# Patient Record
Sex: FEMALE | Race: AMERICAN INDIAN OR ALASKA NATIVE
[De-identification: names, ages, dates, MRNs, and addresses within clinical notes are randomized per-mention and may not be internally consistent; named-entity substitution may affect disease eponyms.]

---

## 2017-07-13 NOTE — EDM.PDOC
ED HPI GENERAL MEDICAL PROBLEM





- General


Chief Complaint: General


Stated Complaint: AMBULANCE


Time Seen by Provider: 17 02:45


Source of Information: Reports: Patient, RN Notes Reviewed


History Limitations: Reports: No Limitations





- History of Present Illness


INITIAL COMMENTS - FREE TEXT/NARRATIVE: 





ED via [facility] with c/o pain to left ribs after falling carrying in groceries, 

unsure what she fell on, pain worse with movement and taking deep breath.  No 

other reported injuries with fall. 


Onset: Today


Location: Reports: Chest (left)


Quality: Reports: Stabbing (with movment), Throbbing


Improves with: Reports: None


Worsens with: Reports: Movement


  ** Left Chest


Pain Score (Numeric/FACES): 8





- Related Data


 Allergies











Allergy/AdvReac Type Severity Reaction Status Date / Time


 


No Known Allergies Allergy   Verified 17 02:25











Home Meds: 


 Home Meds





Albuterol [Proair HFA] 2 puff INH ASDIRECTED PRN 14 [History]


Fluticasone Propionate [Flovent  MCG] 2 puff INH ASDIRECTED PRN 14 

[History]


Lisinopril 40 mg PO DAILY 14 [History]


sitaGLIPtin Phos/Metformin HCl [Janumet 50-1,000 MG] 1,000 mg PO DAILY 14 

[History]


Insulin Aspart [Novolog Flexpen] 25 units SQ TID PRN 16 [History]


Insulin Glarg,Human.Rec.Analog [Lantus] 30 units SQ BID 16 [History]


Gabapentin [Gabapentin] 600 mg PO TID 16 [History]











Past Medical History


HEENT History: Reports: None


Cardiovascular History: Reports: Hypertension, Other (See Below)


Other Cardiovascular History: hx rhuematic fever


Respiratory History: Reports: Asthma


Gastrointestinal History: Reports: None


Genitourinary History: Reports: None


OB/GYN History: Reports: None


Musculoskeletal History: Reports: Back Pain, Chronic, Osteoarthritis


Neurological History: Reports: Neuropathy, Diabetic, Neuropathy, Peripheral, 

Seizure, TIA


Other Neuro History: mini strokes


Psychiatric History: Reports: None


Endocrine/Metabolic History: Reports: Diabetes, Type II, IDDM, Obesity/BMI 30+


Hematologic History: Reports: None


Immunologic History: Reports: None


Oncologic (Cancer) History: Reports: None


Dermatologic History: Reports: None





- Infectious Disease History


Infectious Disease History: Reports: Chicken Pox





- Past Surgical History


Head Surgeries/Procedures: Reports: None


Female  Surgical History: Reports:  Section





Social & Family History





- Family History


Family Medical History: Noncontributory


Respiratory: Reports: Asthma


Other Respiratory Family Hisory: Brother


Endocrine/Metabolic: Reports: Diabetes, type II


Other Endocrine/Metabolic Family History: Brother





- Tobacco Use


Smoking Status *Q: Current Every Day Smoker


Years of Tobacco use: 20


Packs/Tins Daily: 0.2


Used Tobacco, but Quit: No


Month Tobacco Last Used: 


Second Hand Smoke Exposure: Yes





- Caffeine Use


Caffeine Use: Reports: Soda





- Alcohol Use


Days Per Week of Alcohol Use: 0





- Recreational Drug Use


Recreational Drug Use: No





- Sexual History


Sexual History: Reports: Sexually Active, Single Partner





- Living Situation & Occupation


Living situation: Reports: with Significant Other, with Family


Occupation: Unemployed





ED ROS GENERAL





- Review of Systems


Review Of Systems: ROS reveals no pertinent complaints other than HPI.





ED EXAM, GENERAL





- Physical Exam


Exam: See Below


Exam Limited By: No Limitations


General Appearance: Alert, Mild Distress, Obese


Eye Exam: Bilateral Eye: EOMI, PERRL


Nose: Normal Inspection


Throat/Mouth: No: Normal Teeth (poor dentation)


Head: Atraumatic, Normocephalic


Neck: Normal Inspection


Respiratory/Chest: No Respiratory Distress, Lungs Clear, Decreased Breath 

Sounds (left base), Splinting (left)


Cardiovascular: Normal Peripheral Pulses, Regular Rate, Rhythm


GI/Abdominal: Normal Bowel Sounds, Soft, Non-Tender


Back Exam: Normal Inspection


Extremities: Normal Inspection


Neurological: Alert, Oriented, Normal Cognition, Normal Reflexes


Skin Exam: Warm, Dry, Intact


Lymphatic: No Adenopathy





Course





- Vital Signs


Last Recorded V/S: 


 Last Vital Signs











Temp  97.8 F   17 02:29


 


Pulse  105 H  17 02:29


 


Resp  20   17 02:29


 


BP  149/87 H  17 02:29


 


Pulse Ox  100   17 02:29














- Orders/Labs/Meds


Meds: 


Medications














Discontinued Medications














Generic Name Dose Route Start Last Admin





  Trade Name Freq  PRN Reason Stop Dose Admin


 


Oxycodone/Acetaminophen  1 tab  17 02:56  17 03:03





  Percocet 325-5 Mg  PO  17 02:57  1 tab





  ONETIME ONE   Administration














Departure





- Departure


Time of Disposition: 04:37


Disposition: Home, Self-Care 01


Condition: Good


Clinical Impression: 


 Rib pain on left side








- Discharge Information


Instructions:  Rib Contusion


Forms:  ED Department Discharge


Additional Instructions: 


alternate tylenol 650mg with Ibuprofen 600mg every 4 hours


splint area with coughing


change positions slowly


follow up on Monday in clinic sooner if pain worsens or breathing difficulty

## 2017-08-24 NOTE — EDM.PDOC
ED HPI GENERAL MEDICAL PROBLEM





- General


Chief Complaint: Neuro Symptoms/Deficits


Stated Complaint: GENERAL


Time Seen by Provider: 17 16:50


Source of Information: Reports: Patient


History Limitations: Reports: No Limitations





- History of Present Illness


INITIAL COMMENTS - FREE TEXT/NARRATIVE: 





This 40 yo female patient was brought to the ED by [facility] due to a possible 

stroke. EMS reports when they appeared on scene the patient had slurred speech, 

right sided facial droop, right arm and right leg weakness. EMS reports her 

blood sugar was 120's during their visit. The patient was immediately taken to 

the CT for a scan of her head. The patient's  reports he was with the 

patient this morning until about noon. The patient was acting normally and they 

went for a 2 block walk to their son's school. The  reports he went back 

to work and called the house at about 1300 and the patient was slurring her 

words. The  came directly home and checked the patient's blood sugar. 

The patient's blood sugar at that time was 504. The  gave the patient 

some insulin and returned to work. The  called back to the house at 

about 1500 and the patient was again slurring her words. The  returned 

to the house, checked the patient's blood sugar (340's) and gave her some more 

insulin. Just prior to calling the ambulance, the patient told her  that 

she had fallen earlier today hitting the back of her head. At that time, the 

 noticed that the patient had right sided facial droop, right arm 

weakness and right leg weakness. The  reports the patient has a history 

of being transported by air to [facility] in [place] for a blood infection and 

was in a coma for a week. Since that time, the  reports that the patient 

has been having "little strokes." 


Onset: Today


Onset Date: 17


Duration: Constant


Location: Reports: Head, Face, Upper Extremity, Right, Lower Extremity, Right


Quality: Reports: Other


Severity: Severe


Improves with: Reports: None


Worsens with: Reports: None


Associated Symptoms: Reports: No Other Symptoms





- Related Data


 Allergies











Allergy/AdvReac Type Severity Reaction Status Date / Time


 


No Known Allergies Allergy   Verified 17 17:17











Home Meds: 


 Home Meds





Albuterol [Proair HFA] 2 puff INH ASDIRECTED PRN 14 [History]


Fluticasone Propionate [Flovent  MCG] 2 puff INH ASDIRECTED PRN 14 

[History]


Lisinopril 40 mg PO DAILY 14 [History]


sitaGLIPtin Phos/Metformin HCl [Janumet 50-1,000 MG] 1,000 mg PO DAILY 14 

[History]


Insulin Aspart [Novolog Flexpen] 25 units SQ TID PRN 16 [History]


Insulin Glarg,Human.Rec.Analog [Lantus] 30 units SQ BID 16 [History]


Gabapentin [Gabapentin] 600 mg PO TID 16 [History]


Aspirin [Ecotrin] 81 mg PO DAILY 17 [History]


Ibuprofen [Motrin] 800 mg PO ASDIRECTED PRN 17 [History]











Past Medical History


HEENT History: Reports: None


Cardiovascular History: Reports: Hypertension, Other (See Below)


Other Cardiovascular History: hx rhuematic fever


Respiratory History: Reports: Asthma


Gastrointestinal History: Reports: None


Genitourinary History: Reports: None


OB/GYN History: Reports: None


Musculoskeletal History: Reports: Back Pain, Chronic, Osteoarthritis


Neurological History: Reports: Neuropathy, Diabetic, Neuropathy, Peripheral, 

Seizure, TIA


Other Neuro History: mini strokes


Psychiatric History: Reports: None


Endocrine/Metabolic History: Reports: Diabetes, Type II, IDDM, Obesity/BMI 30+


Hematologic History: Reports: None


Immunologic History: Reports: None


Oncologic (Cancer) History: Reports: None


Dermatologic History: Reports: None





- Infectious Disease History


Infectious Disease History: Reports: Chicken Pox





- Past Surgical History


Head Surgeries/Procedures: Reports: None


Female  Surgical History: Reports:  Section





Social & Family History





- Family History


Family Medical History: Noncontributory


Respiratory: Reports: Asthma


Other Respiratory Family Hisory: Brother


Endocrine/Metabolic: Reports: Diabetes, type II


Other Endocrine/Metabolic Family History: Brother





- Tobacco Use


Smoking Status *Q: Current Every Day Smoker


Years of Tobacco use: 20


Packs/Tins Daily: 0.2


Used Tobacco, but Quit: No


Month Tobacco Last Used: 02


Second Hand Smoke Exposure: Yes





- Caffeine Use


Caffeine Use: Reports: Soda





- Alcohol Use


Days Per Week of Alcohol Use: 0





- Recreational Drug Use


Recreational Drug Use: No





- Sexual History


Sexual History: Reports: Sexually Active, Single Partner





- Living Situation & Occupation


Living situation: Reports: with Significant Other, with Family


Occupation: Unemployed





ED ROS GENERAL





- Review of Systems


Review Of Systems: ROS reveals no pertinent complaints other than HPI.





ED EXAM, NEURO





- Physical Exam


Exam: See Below


Exam Limited By: No Limitations


General Appearance: Alert, WD/WN, Moderate Distress, Obese


Eye Exam: Bilateral Eye: EOMI, Normal Inspection, PERRL (sluggish but reactive)


Ears: Normal External Exam, Normal Canal, Hearing Grossly Normal


Nose: Normal Inspection, Normal Mucosa, No Blood


Throat/Mouth: Normal Inspection, Normal Lips, Normal Teeth, Normal Gums, Normal 

Oropharynx, Normal Voice, No Airway Compromise


Head Exam: Atraumatic, Normocephalic, Other (right sided facial droop)


Neck: Normal Inspection, Supple, Non-Tender


Respiratory/Chest: No Respiratory Distress, Lungs Clear, Normal Breath Sounds, 

No Accessory Muscle Use, Chest Non-Tender


Cardiovascular: Normal Peripheral Pulses, Regular Rate, Rhythm, No Edema, No 

Gallop, No JVD, No Murmur, No Rub


GI/Abdominal: Normal Bowel Sounds, Soft, Non-Tender, No Organomegaly, No 

Distention, No Abnormal Bruit, No Mass, Pelvis Stable


 (Female) Exam: Deferred


Rectal (Female) Exam: Deferred


Neurological: Alert, Normal Mood/Affect, Other (right sided facial droop, right 

sided upper and lower extremity weakness, slurred speach)


Back Exam: Normal Inspection, Full Range of Motion, NT


Extremities: Other (see above (right sided weakness))


Psychiatric: Normal Affect, Normal Mood


Skin Exam: Warm, Dry, Intact, Normal Color, No Rash





Course





- Orders/Labs/Meds


Orders: 





 Active Orders 24 hr











 Category Date Time Status


 


 EKG Documentation Completion [RC] URGENT Care  17 16:51 Active


 


 CBC WITH AUTO DIFF [HEME] Urgent Lab  17 16:51 Ordered


 


 COMPREHENSIVE METABOLIC PN,CMP [CHEM] Urgent Lab  17 16:51 Ordered


 


 DRUG SCREEN URINE BIORAD [URCHEM] Stat Lab  17 16:51 Uncollected


 


 ETHANOL BLOOD MEDICAL [CHEM] Stat Lab  17 16:59 Ordered


 


 INR,PT,PROTHROMBIN TIME [COAG] Stat Lab  17 16:59 Ordered


 


 KETONES,BLOOD [CHEM] Stat Lab  17 17:03 Ordered


 


 TROPONIN I [CHEM] Urgent Lab  17 16:51 Ordered


 


 UA W/MICROSCOPIC [URIN] Stat Lab  17 16:51 Uncollected














Departure





- Departure


Time of Disposition: 17:20


Disposition: DC/Tfer to Acute Hospital 02


Condition: Poor


Clinical Impression: 


CVA (cerebral vascular accident)


Qualifiers:


 CVA mechanism: other Qualified Code(s): I63.8 - Other cerebral infarction








- Discharge Information


Forms:  Interfacility Transfer EMTALA


Care Plan Goals: 


Discussed the history, examination, initial lab, CT and EKG results with Dr. Piedra (Hospitalist with [facility] in [place]). Dr. Piedra accepted the patient 

for continued evaluation and management. The patient will be transported by 

[facility]. 





- My Orders


Last 24 Hours: 





My Active Orders





17 16:51


EKG Documentation Completion [RC] URGENT 


CBC WITH AUTO DIFF [HEME] Urgent 


COMPREHENSIVE METABOLIC PN,CMP [CHEM] Urgent 


DRUG SCREEN URINE BIORAD [URCHEM] Stat 


TROPONIN I [CHEM] Urgent 


UA W/MICROSCOPIC [URIN] Stat 





17 16:59


ETHANOL BLOOD MEDICAL [CHEM] Stat 


INR,PT,PROTHROMBIN TIME [COAG] Stat 





17 17:03


KETONES,BLOOD [CHEM] Stat 














- Assessment/Plan


Last 24 Hours: 





My Active Orders





17 16:51


EKG Documentation Completion [RC] URGENT 


CBC WITH AUTO DIFF [HEME] Urgent 


COMPREHENSIVE METABOLIC PN,CMP [CHEM] Urgent 


DRUG SCREEN URINE BIORAD [URCHEM] Stat 


TROPONIN I [CHEM] Urgent 


UA W/MICROSCOPIC [URIN] Stat 





17 16:59


ETHANOL BLOOD MEDICAL [CHEM] Stat 


INR,PT,PROTHROMBIN TIME [COAG] Stat 





17 17:03


KETONES,BLOOD [CHEM] Stat

## 2017-08-28 NOTE — EKG
08/24/2017- PAGE, DIAMOND SAUCEDO -

 

EKG per my reading shows sinus rhythm with PVC.

 

DD:  08/27/2017 09:33:36

DT:  08/27/2017 10:08:23

[facility]

Job #:  [id number]

## 2017-11-20 NOTE — EDM.PDOC
ED HPI GENERAL MEDICAL PROBLEM





- General


Chief Complaint: Skin Complaint


Stated Complaint: INFECTED FINGER, [id number]


Time Seen by Provider: 17 21:40





- History of Present Illness


INITIAL COMMENTS - FREE TEXT/NARRATIVE: 





c/o infected fingers, burned last week taking tervino out of oven. left 3rd tip 

draining. Denies hx of previous skin infections


Treatments PTA: Reports: Other (see below)


Other Treatments PTA: none


  ** Left 3-Middle finger


Pain Score (Numeric/FACES): 8





- Related Data


 Allergies











Allergy/AdvReac Type Severity Reaction Status Date / Time


 


No Known Allergies Allergy   Verified 17 21:48











Home Meds: 


 Home Meds





Albuterol [Proair HFA] 2 puff INH ASDIRECTED PRN 14 [History]


Fluticasone Propionate [Flovent  MCG] 2 puff INH ASDIRECTED PRN 14 

[History]


Lisinopril 40 mg PO DAILY 14 [History]


sitaGLIPtin Phos/Metformin HCl [Janumet 50-1,000 MG] 1,000 mg PO DAILY 14 

[History]


Insulin Aspart [Novolog Flexpen] 33 - 40 units SQ TID PRN 16 [History]


Insulin Glarg,Human.Rec.Analog [Lantus] 30 units SQ BID 16 [History]


Gabapentin [Gabapentin] 600 mg PO TID 16 [History]


Aspirin [Ecotrin] 81 mg PO DAILY 17 [History]


Ibuprofen [Motrin] 800 mg PO ASDIRECTED PRN 17 [History]











Past Medical History


HEENT History: Reports: None


Cardiovascular History: Reports: Hypertension, Other (See Below)


Other Cardiovascular History: hx rhuematic fever


Respiratory History: Reports: Asthma


Gastrointestinal History: Reports: None


Genitourinary History: Reports: None


OB/GYN History: Reports: None


Musculoskeletal History: Reports: Back Pain, Chronic, Osteoarthritis


Neurological History: Reports: Neuropathy, Diabetic, Neuropathy, Peripheral, 

Seizure, TIA


Other Neuro History: mini strokes


Psychiatric History: Reports: None


Endocrine/Metabolic History: Reports: Diabetes, Type II, IDDM, Obesity/BMI 30+


Hematologic History: Reports: None


Immunologic History: Reports: None


Oncologic (Cancer) History: Reports: None


Dermatologic History: Reports: None





- Infectious Disease History


Infectious Disease History: Reports: Chicken Pox





- Past Surgical History


Head Surgeries/Procedures: Reports: None


Female  Surgical History: Reports:  Section





Social & Family History





- Family History


Family Medical History: Noncontributory


Respiratory: Reports: Asthma


Other Respiratory Family Hisory: Brother


Endocrine/Metabolic: Reports: Diabetes, type II


Other Endocrine/Metabolic Family History: Brother





- Tobacco Use


Smoking Status *Q: Current Every Day Smoker


Years of Tobacco use: 15


Packs/Tins Daily: 0.2


Used Tobacco, but Quit: No


Month Tobacco Last Used: 


Second Hand Smoke Exposure: Yes





- Caffeine Use


Caffeine Use: Reports: Soda





- Alcohol Use


Days Per Week of Alcohol Use: 0





- Recreational Drug Use


Recreational Drug Use: No





- Sexual History


Sexual History: Reports: Sexually Active, Single Partner





- Living Situation & Occupation


Living situation: Reports: with Significant Other, with Family


Occupation: Unemployed





ED ROS GENERAL





- Review of Systems


Review Of Systems: ROS reveals no pertinent complaints other than HPI.





ED EXAM, SKIN/RASH


Exam: See Below


Exam Limited By: No Limitations


General Appearance: Alert, No Apparent Distress


Eye Exam: Bilateral Eye: EOMI


Ears: Normal External Exam


Nose: Normal Inspection


Throat/Mouth: Normal Inspection


Head: Atraumatic, Normocephalic


Neck: Normal Inspection.  No: Lymphadenopathy (L), Lymphadenopathy (R)


Respiratory/Chest: No Respiratory Distress, Lungs Clear, Normal Breath Sounds


Cardiovascular: Regular Rate, Rhythm


GI/Abdominal: Normal Bowel Sounds


Neurological: Alert, Oriented


Skin: Warm, Dry, Erythema, Increased Warmth, Wound/Incision (calloused 

blistering to left 2nd 3rd and 4th finger tip. 3rd greatest. Scant drainage.)





Course





- Vital Signs


Last Recorded V/S: 


 Last Vital Signs











Temp  96.2 F   17 21:27


 


Pulse  102 H  17 21:27


 


Resp  18   17 21:27


 


BP  119/71   17 21:27


 


Pulse Ox  100   17 21:27














- Orders/Labs/Meds


Orders: 


 Active Orders 24 hr











 Category Date Time Status


 


 CULTURE WOUND [RM] Stat Lab  17 22:03 Received











Meds: 


Medications














Discontinued Medications














Generic Name Dose Route Start Last Admin





  Trade Name Freq  PRN Reason Stop Dose Admin


 


Amoxicillin/Clavulanate Potassium  Confirm  17 22:26  17 22:30





  Augmentin 875 Mg/125 Mg  Administered  17 22:27  Not Given





  Dose   





  2 tab   





  .ROUTE   





  .STK-MED ONE   


 


Mupirocin  Confirm  17 22:26  17 22:30





  Bactroban Oint  Administered  17 22:27  Not Given





  Dose   





  22 gm   





  .ROUTE   





  .STK-MED ONE   














Departure





- Departure


Time of Disposition: 22:19


Disposition: Home, Self-Care 01


Condition: Good


Clinical Impression: 


 Infected finger








- Discharge Information


Instructions:  Burn Care, Easy-to-Read


Referrals: 


Lele Maciel NP [Primary Care Provider] - 


Forms:  ED Department Discharge


Additional Instructions: 


augmentin 875mg one twice daily for one week


warm soak finger three times daily 


mupirocin to fingers twice daily cover with bandaide


recheck in clinic later this week





- My Orders


Last 24 Hours: 


My Active Orders





17 22:03


CULTURE WOUND [RM] Stat 














- Assessment/Plan


Last 24 Hours: 


My Active Orders





17 22:03


CULTURE WOUND [RM] Stat

## 2018-01-06 NOTE — EDM.PDOC
ED HPI GENERAL MEDICAL PROBLEM





- General


Chief Complaint: General


Stated Complaint: FELL, LEG AND BACK PAIN, [id number]


Time Seen by Provider: 18 19:00


Source of Information: Reports: Patient


History Limitations: Reports: No Limitations





- History of Present Illness


INITIAL COMMENTS - FREE TEXT/NARRATIVE: 





C/o pain above left eye, right hip and low back after fallling last perez when 

walker got stuck in snow and fell forward. Reports no loss of consciousness. 

Assited up by spouse. Continued pain despite ibupofen.


  ** Right Leg


Pain Score (Numeric/FACES): 8





- Related Data


 Allergies











Allergy/AdvReac Type Severity Reaction Status Date / Time


 


No Known Allergies Allergy   Verified 17 19:14











Home Meds: 


 Home Meds





Albuterol [Proair HFA] 2 puff INH ASDIRECTED PRN 14 [History]


Fluticasone Propionate [Flovent  MCG] 2 puff INH ASDIRECTED PRN 14 

[History]


Lisinopril 40 mg PO DAILY 14 [History]


sitaGLIPtin Phos/Metformin HCl [Janumet 50-1,000 MG] 1,000 mg PO DAILY 14 

[History]


Insulin Aspart [Novolog Flexpen] 33 - 40 units SQ TID PRN 16 [History]


Insulin Glarg,Human.Rec.Analog [Lantus] 30 units SQ BID 16 [History]


Gabapentin [Gabapentin] 600 mg PO TID 16 [History]


Aspirin [Ecotrin] 81 mg PO DAILY 17 [History]


Ibuprofen [Motrin] 800 mg PO ASDIRECTED PRN 17 [History]


Clopidogrel Bisulfate [Clopidogrel] 1 tab PO DAILY 18 [History]


Hydrochlorothiazide [Hydrochlorothiazide] 1 tab PO DAILY 18 [History]


Pregabalin [Lyrica] 1 tab PO TID 18 [History]


Rosuvastatin Calcium 1 tab PO BEDTIME 18 [History]


amLODIPine [Norvasc] 1 tab PO DAILY 18 [History]


levETIRAcetam [Keppra] 1 tab PO BID 18 [History]











Past Medical History


HEENT History: Reports: None


Cardiovascular History: Reports: Hypertension, Other (See Below)


Other Cardiovascular History: hx rhuematic fever


Respiratory History: Reports: Asthma


Gastrointestinal History: Reports: None


Genitourinary History: Reports: None


OB/GYN History: Reports: None, Pregnancy


Musculoskeletal History: Reports: Back Pain, Chronic, Osteoarthritis


Neurological History: Reports: Neuropathy, Diabetic, Neuropathy, Peripheral, 

Seizure, TIA


Other Neuro History: mini strokes


Psychiatric History: Reports: None


Endocrine/Metabolic History: Reports: Diabetes, Type II, IDDM, Obesity/BMI 30+


Hematologic History: Reports: None


Immunologic History: Reports: None


Oncologic (Cancer) History: Reports: None


Dermatologic History: Reports: None





- Infectious Disease History


Infectious Disease History: Reports: Chicken Pox





- Past Surgical History


Head Surgeries/Procedures: Reports: None


Female  Surgical History: Reports:  Section





Social & Family History





- Family History


Family Medical History: Noncontributory


Respiratory: Reports: Asthma


Other Respiratory Family Hisory: Brother


Endocrine/Metabolic: Reports: Diabetes, type II


Other Endocrine/Metabolic Family History: Brother





- Tobacco Use


Smoking Status *Q: Current Every Day Smoker


Years of Tobacco use: 20


Packs/Tins Daily: 0.1


Used Tobacco, but Quit: No


Month Tobacco Last Used: 02


Second Hand Smoke Exposure: Yes





- Caffeine Use


Caffeine Use: Reports: Soda





- Alcohol Use


Days Per Week of Alcohol Use: 0





- Recreational Drug Use


Recreational Drug Use: No





- Sexual History


Sexual History: Reports: Sexually Active, Single Partner





- Living Situation & Occupation


Living situation: Reports: with Significant Other, with Family


Occupation: Unemployed





ED ROS GENERAL





- Review of Systems


Review Of Systems: ROS reveals no pertinent complaints other than HPI.





ED EXAM, GENERAL





- Physical Exam


Exam: See Below


Exam Limited By: No Limitations


General Appearance: Alert, No Apparent Distress, Obese


Eye Exam: Bilateral Eye: EOMI, PERRL


Ears: Normal External Exam, Normal TMs


Nose: Normal Inspection


Throat/Mouth: Normal Inspection


Head: Facial Tenderness (amll bruise mild swelling outer left eyebrow tender to 

palpation)


Neck: Non-Tender, Full Range of Motion


Respiratory/Chest: No Respiratory Distress, Lungs Clear, Normal Breath Sounds


Cardiovascular: Normal Peripheral Pulses, Regular Rate, Rhythm


GI/Abdominal: Normal Bowel Sounds, Soft


Back Exam: Other (generalized low back tenderness with palpation, no bruising)


Extremities: No: Normal Range of Motion (limited internal roatation right hip 

tender upper lateral thigh, no bruising. )


Neurological: Oriented, Normal Cognition


Psychiatric: Flat Affect


Skin Exam: Warm, Dry, Intact, Normal Color





Course





- Vital Signs


Last Recorded V/S: 


 Last Vital Signs











Temp  99.2 F   18 20:33


 


Pulse  103 H  18 20:33


 


Resp  16   18 20:33


 


BP  140/71   18 20:33


 


Pulse Ox  98   18 20:33














- Orders/Labs/Meds


Orders: 


 Active Orders 24 hr











 Category Date Time Status


 


 Glucose [Blood Glucose Check, Bedside] [RC] ONETIME Care  18 20:45 Active











Labs: 


 Laboratory Tests











  18 Range/Units





  19:14 19:14 19:15 


 


WBC    12.5 H  (5.0-10.0)  10^3/uL


 


RBC    4.14 L  (4.2-5.4)  10^6/uL


 


Hgb    12.0  (12.0-16.0)  g/dL


 


Hct    34.2 L  (37.0-47.0)  %


 


MCV    82.6  D  ()  fL


 


MCH    29.0  (27.0-34.0)  pg


 


MCHC    35.1 H  (33.0-35.0)  g/dL


 


Plt Count    256  D  (150-450)  10^3/uL


 


Neut % (Auto)    71.5  (42.2-75.2)  %


 


Lymph % (Auto)    19.2 L  (20.5-50.1)  %


 


Mono % (Auto)    6.9  (2-8)  %


 


Eos % (Auto)    2.2  (1.0-3.0)  %


 


Baso % (Auto)    0.2  (0.0-1.0)  %


 


Add Manual Diff    Yes  


 


Neutrophils % (Manual)    77 H  (42-75)  %


 


Lymphocytes % (Manual)    17 L  (20-50)  %


 


Monocytes % (Manual)    3  (2-8)  %


 


Eosinophils % (Manual)    3  (1-3)  %


 


Sodium     (135-145)  mmol/L


 


Potassium     (3.6-5.0)  mmol/L


 


Chloride     (101-111)  mmol/L


 


Carbon Dioxide     (21.0-31.0)  mmol/L


 


Anion Gap     


 


BUN     (7-18)  mg/dL


 


Creatinine     (0.6-1.3)  mg/dL


 


Est Cr Clr Drug Dosing     mL/min


 


Estimated GFR (MDRD)     


 


BUN/Creatinine Ratio     


 


Glucose     ()  mg/dL


 


Calcium     (8.4-10.2)  mg/dl


 


Total Bilirubin     (0.2-1.0)  mg/dL


 


AST     (10-42)  IU/L


 


ALT     (10-60)  IU/L


 


Alkaline Phosphatase     ()  IU/L


 


Total Protein     (6.7-8.2)  g/dl


 


Albumin     (3.2-5.5)  g/dl


 


Globulin     


 


Albumin/Globulin Ratio     


 


Urine Color   Light yellow   (YELLOW)  


 


Urine Appearance   Clear   (CLEAR)  


 


Urine pH   7.0   (5.0-9.0)  


 


Ur Specific Gravity   1.015   (1.005-1.030)  


 


Urine Protein   100 H   (NEGATIVE)  


 


Urine Glucose (UA)   500 H   (NEGATIVE)  


 


Urine Ketones   Negative   (NEGATIVE)  


 


Urine Occult Blood   Small H   (NEGATIVE)  


 


Urine Nitrite   Negative   (NEGATIVE)  


 


Urine Bilirubin   Negative   (NEGATIVE)  


 


Urine Urobilinogen   0.2   (0.2-1.0)  mg/dL


 


Ur Leukocyte Esterase   Negative   (NEGATIVE)  


 


Urine RBC   0-5   /HPF


 


Urine WBC   0-5   (0-5/HPF)  /HPF


 


Ur Epithelial Cells   Few   /HPF


 


Urine Bacteria   Occasional   (0-FEW/HPF)  /HPF


 


Urine Opiates Screen  Negative    (NEGATIVE)  


 


Ur Oxycodone Screen  Negative    (NEGATIVE)  


 


Urine Methadone Screen  Negative    (NEGATIVE)  


 


Ur Barbiturates Screen  Negative    (NEGATIVE)  


 


U Tricyclic Antidepress  Negative    (NEGATIVE)  


 


Ur Phencyclidine Scrn  Negative    (NEGATIVE)  


 


Ur Amphetamine Screen  Negative    (NEGATIVE)  


 


U Methamphetamines Scrn  Negative    (NEGATIVE)  


 


Urine MDMA Screen  Negative    (NEGATIVE)  


 


U Benzodiazepines Scrn  Negative    (NEGATIVE)  


 


Urine Cocaine Screen  Negative    (NEGATIVE)  


 


U Marijuana (THC) Screen  Negative    (NEGATIVE)  














  18 Range/Units





  19:15 


 


WBC   (5.0-10.0)  10^3/uL


 


RBC   (4.2-5.4)  10^6/uL


 


Hgb   (12.0-16.0)  g/dL


 


Hct   (37.0-47.0)  %


 


MCV   ()  fL


 


MCH   (27.0-34.0)  pg


 


MCHC   (33.0-35.0)  g/dL


 


Plt Count   (150-450)  10^3/uL


 


Neut % (Auto)   (42.2-75.2)  %


 


Lymph % (Auto)   (20.5-50.1)  %


 


Mono % (Auto)   (2-8)  %


 


Eos % (Auto)   (1.0-3.0)  %


 


Baso % (Auto)   (0.0-1.0)  %


 


Add Manual Diff   


 


Neutrophils % (Manual)   (42-75)  %


 


Lymphocytes % (Manual)   (20-50)  %


 


Monocytes % (Manual)   (2-8)  %


 


Eosinophils % (Manual)   (1-3)  %


 


Sodium  128 L  (135-145)  mmol/L


 


Potassium  3.2 L  (3.6-5.0)  mmol/L


 


Chloride  96 L  (101-111)  mmol/L


 


Carbon Dioxide  26.0  (21.0-31.0)  mmol/L


 


Anion Gap  9.2  


 


BUN  14  (7-18)  mg/dL


 


Creatinine  1.2  (0.6-1.3)  mg/dL


 


Est Cr Clr Drug Dosing  60.35  mL/min


 


Estimated GFR (MDRD)  49  


 


BUN/Creatinine Ratio  11.66  


 


Glucose  604 H*  ()  mg/dL


 


Calcium  8.5  (8.4-10.2)  mg/dl


 


Total Bilirubin  0.3  (0.2-1.0)  mg/dL


 


AST  13  (10-42)  IU/L


 


ALT  14  (10-60)  IU/L


 


Alkaline Phosphatase  158 H  ()  IU/L


 


Total Protein  7.3  (6.7-8.2)  g/dl


 


Albumin  2.7 L  (3.2-5.5)  g/dl


 


Globulin  4.6  


 


Albumin/Globulin Ratio  0.59  


 


Urine Color   (YELLOW)  


 


Urine Appearance   (CLEAR)  


 


Urine pH   (5.0-9.0)  


 


Ur Specific Gravity   (1.005-1.030)  


 


Urine Protein   (NEGATIVE)  


 


Urine Glucose (UA)   (NEGATIVE)  


 


Urine Ketones   (NEGATIVE)  


 


Urine Occult Blood   (NEGATIVE)  


 


Urine Nitrite   (NEGATIVE)  


 


Urine Bilirubin   (NEGATIVE)  


 


Urine Urobilinogen   (0.2-1.0)  mg/dL


 


Ur Leukocyte Esterase   (NEGATIVE)  


 


Urine RBC   /HPF


 


Urine WBC   (0-5/HPF)  /HPF


 


Ur Epithelial Cells   /HPF


 


Urine Bacteria   (0-FEW/HPF)  /HPF


 


Urine Opiates Screen   (NEGATIVE)  


 


Ur Oxycodone Screen   (NEGATIVE)  


 


Urine Methadone Screen   (NEGATIVE)  


 


Ur Barbiturates Screen   (NEGATIVE)  


 


U Tricyclic Antidepress   (NEGATIVE)  


 


Ur Phencyclidine Scrn   (NEGATIVE)  


 


Ur Amphetamine Screen   (NEGATIVE)  


 


U Methamphetamines Scrn   (NEGATIVE)  


 


Urine MDMA Screen   (NEGATIVE)  


 


U Benzodiazepines Scrn   (NEGATIVE)  


 


Urine Cocaine Screen   (NEGATIVE)  


 


U Marijuana (THC) Screen   (NEGATIVE)  











Meds: 


Medications














Discontinued Medications














Generic Name Dose Route Start Last Admin





  Trade Name Genaro  PRN Reason Stop Dose Admin


 


Insulin Human Regular  10 unit  18 20:17  18 20:23





  Humulin R  SUBCUT  18 20:18  10 units





  ONETIME ONE   Administration





  Protocol   














- Radiology Interpretation


Free Text/Narrative:: 





CT head lumbar and pelvis normal.





- Re-Assessments/Exams


Free Text/Narrative Re-Assessment/Exam: 





18 00:54


Patient left refusing to sign discharge paperwork 





Departure





- Departure


Time of Disposition: 20:30


Disposition: Home, Self-Care 01


Condition: Good


Clinical Impression: 


 Right hip pain





Fall


Qualifiers:


 Encounter type: initial encounter Qualified Code(s): W19.XXXA - Unspecified 

fall, initial encounter





Facial contusion


Qualifiers:


 Encounter type: initial encounter Qualified Code(s): S00.83XA - Contusion of 

other part of head, initial encounter





Low back pain


Qualifiers:


 Chronicity: unspecified Back pain laterality: bilateral Sciatica presence: 

without sciatica Qualified Code(s): M54.5 - Low back pain








- Discharge Information


Instructions:  Lumbosacral Strain


Referrals: 


Lele Maciel NP [Primary Care Provider] - 


Forms:  ED Department Discharge


Additional Instructions: 


alternate tylenol and ibuprofen every 4-6 hours for discomfort


warm towel to low back area 


follow up in clinic next week if continued pain





- My Orders


Last 24 Hours: 


My Active Orders





18 20:45


Glucose [Blood Glucose Check, Bedside] [RC] ONETIME 














- Assessment/Plan


Last 24 Hours: 


My Active Orders





18 20:45


Glucose [Blood Glucose Check, Bedside] [RC] ONETIME

## 2018-02-06 NOTE — EDM.PDOC
Scribed by Zulay Ochoa [phone] for Jessica Main NP





ED HPI GENERAL MEDICAL PROBLEM





- General


Chief Complaint: General


Stated Complaint: FELL BACK/LEG INJURY  [id number]


Time Seen by Provider: 18 15:20


Source of Information: Reports: Patient, RN, RN Notes Reviewed


History Limitations: Reports: No Limitations





- History of Present Illness


INITIAL COMMENTS - FREE TEXT/NARRATIVE: 


Patient presents to ER with complaint of falling on her back. She states she 

fell on the ice about 1500. Patient states she hurts in lower back and left leg 

7/10 is her pain. She has some numbness and tingling in left buttock. 


Onset: Today


Duration: Constant


Location: Reports: Back


Quality: Reports: Ache


Severity: Moderate


Improves with: Reports: None


Worsens with: Reports: None


Associated Symptoms: Reports: No Other Symptoms


  ** Lower Back


Pain Score (Numeric/FACES): 8





  ** Left Hand


Pain Score (Numeric/FACES): 8





  ** Left Knee


Pain Score (Numeric/FACES): 8





- Related Data


 Allergies











Allergy/AdvReac Type Severity Reaction Status Date / Time


 


No Known Allergies Allergy   Verified 17 19:14











Home Meds: 


 Home Meds





Albuterol [Proair HFA] 2 puff INH ASDIRECTED PRN 14 [History]


Fluticasone Propionate [Flovent  MCG] 2 puff INH ASDIRECTED PRN 14 

[History]


Lisinopril 40 mg PO DAILY 14 [History]


sitaGLIPtin Phos/Metformin HCl [Janumet 50-1,000 MG] 1,000 mg PO DAILY 14 

[History]


Insulin Aspart [Novolog Flexpen] 33 - 40 units SQ TID PRN 16 [History]


Insulin Glarg,Human.Rec.Analog [Lantus] 30 units SQ BID 16 [History]


Gabapentin [Gabapentin] 600 mg PO TID 16 [History]


Aspirin [Ecotrin] 81 mg PO DAILY 17 [History]


Ibuprofen [Motrin] 800 mg PO ASDIRECTED PRN 17 [History]


Clopidogrel Bisulfate [Clopidogrel] 1 tab PO DAILY 18 [History]


Hydrochlorothiazide [Hydrochlorothiazide] 1 tab PO DAILY 18 [History]


Pregabalin [Lyrica] 1 tab PO TID 18 [History]


Rosuvastatin Calcium 1 tab PO BEDTIME 18 [History]


amLODIPine [Norvasc] 1 tab PO DAILY 18 [History]


levETIRAcetam [Keppra] 1 tab PO BID 18 [History]











Past Medical History


HEENT History: Reports: None


Cardiovascular History: Reports: Hypertension, Other (See Below)


Other Cardiovascular History: hx rhuematic fever


Respiratory History: Reports: Asthma


Gastrointestinal History: Reports: None


Genitourinary History: Reports: None


OB/GYN History: Reports: Pregnancy


Musculoskeletal History: Reports: Back Pain, Chronic, Osteoarthritis


Neurological History: Reports: Neuropathy, Diabetic, Neuropathy, Peripheral, 

Seizure, TIA


Other Neuro History: mini strokes


Psychiatric History: Reports: None


Endocrine/Metabolic History: Reports: Diabetes, Type II, IDDM, Obesity/BMI 30+


Hematologic History: Reports: None


Immunologic History: Reports: None


Oncologic (Cancer) History: Reports: None


Dermatologic History: Reports: None





- Infectious Disease History


Infectious Disease History: Reports: Chicken Pox





- Past Surgical History


Head Surgeries/Procedures: Reports: None


Female  Surgical History: Reports:  Section





Social & Family History





- Family History


Family Medical History: Noncontributory


Respiratory: Reports: Asthma


Other Respiratory Family Hisory: Brother


Endocrine/Metabolic: Reports: Diabetes, type II


Other Endocrine/Metabolic Family History: Brother





- Tobacco Use


Smoking Status *Q: Current Every Day Smoker


Years of Tobacco use: 20


Packs/Tins Daily: 0.1


Used Tobacco, but Quit: No


Month Tobacco Last Used: 02


Second Hand Smoke Exposure: Yes





- Caffeine Use


Caffeine Use: Reports: Soda





- Alcohol Use


Days Per Week of Alcohol Use: 0





- Recreational Drug Use


Recreational Drug Use: No





- Sexual History


Sexual History: Reports: Sexually Active, Single Partner





- Living Situation & Occupation


Living situation: Reports: with Significant Other, with Family


Occupation: Unemployed





ED ROS GENERAL





- Review of Systems


Review Of Systems: ROS reveals no pertinent complaints other than HPI.





ED EXAM, GENERAL





- Physical Exam


Exam: See Below


Exam Limited By: No Limitations


General Appearance: Alert


Eye Exam: Bilateral Eye: Normal Inspection


Ears: Normal External Exam, Normal Canal, Hearing Grossly Normal, Normal TMs


Nose: Normal Inspection, Normal Mucosa, No Blood


Throat/Mouth: Normal Inspection, Normal Lips, Normal Teeth, Normal Gums, Normal 

Oropharynx, Normal Voice, No Airway Compromise


Head: Atraumatic, Normocephalic


Neck: Normal Inspection, Supple, Non-Tender, Full Range of Motion


Respiratory/Chest: Crackles (bases bilateral)


Cardiovascular: Normal Peripheral Pulses, Regular Rate, Rhythm, No Edema, No 

Gallop, No JVD, No Murmur, No Rub


GI/Abdominal: Normal Bowel Sounds, Soft, Non-Tender, No Organomegaly, No 

Distention, No Abnormal Bruit, No Mass


 (Female) Exam: Deferred


Rectal (Female) Exam: Deferred


Back Exam: Decreased Range of Motion (left leg/back)


Neurological: Alert, Oriented, CN II-XII Intact, Normal Cognition, Normal Gait, 

Normal Reflexes, No Motor/Sensory Deficits


Psychiatric: Normal Affect, Normal Mood


Skin Exam: Warm, Dry, Intact, Normal Color, No Rash


Lymphatic: No Adenopathy





Course





- Vital Signs


Last Recorded V/S: 


 Last Vital Signs











Temp  97.5 F   18 15:08


 


Pulse  90   18 15:08


 


Resp  20   18 15:08


 


BP  110/69   18 15:08


 


Pulse Ox  100   18 15:08














- Orders/Labs/Meds


Labs: 


 Laboratory Tests











  18 Range/Units





  16:44 16:48 16:48 


 


Urine Color     (YELLOW)  


 


Urine Appearance     (CLEAR)  


 


Urine pH     (5.0-9.0)  


 


Ur Specific Gravity     (1.005-1.030)  


 


Urine Protein     (NEGATIVE)  


 


Urine Glucose (UA)     (NEGATIVE)  


 


Urine Ketones     (NEGATIVE)  


 


Urine Occult Blood     (NEGATIVE)  


 


Urine Nitrite     (NEGATIVE)  


 


Urine Bilirubin     (NEGATIVE)  


 


Urine Urobilinogen     (0.2-1.0)  mg/dL


 


Ur Leukocyte Esterase     (NEGATIVE)  


 


Urine RBC     /HPF


 


Urine WBC     (0-5/HPF)  /HPF


 


Ur Epithelial Cells     /HPF


 


Urine Bacteria     (0-FEW/HPF)  /HPF


 


Urine Yeast     (0/HPF)  /HPF


 


Urine HCG, Qual    Negative  


 


Urine Opiates Screen   Negative   (NEGATIVE)  


 


Ur Oxycodone Screen   Negative   (NEGATIVE)  


 


Urine Methadone Screen   Negative   (NEGATIVE)  


 


Ur Barbiturates Screen   Negative   (NEGATIVE)  


 


U Tricyclic Antidepress   Negative   (NEGATIVE)  


 


Ur Phencyclidine Scrn   Negative   (NEGATIVE)  


 


Ur Amphetamine Screen   Negative   (NEGATIVE)  


 


U Methamphetamines Scrn   Negative   (NEGATIVE)  


 


Urine MDMA Screen   Negative   (NEGATIVE)  


 


U Benzodiazepines Scrn   Negative   (NEGATIVE)  


 


Urine Cocaine Screen   Negative   (NEGATIVE)  


 


U Marijuana (THC) Screen   Negative   (NEGATIVE)  


 


Ethyl Alcohol  < 5    mg/dL














  18 Range/Units





  16:48 


 


Urine Color  Yellow  (YELLOW)  


 


Urine Appearance  Clear  (CLEAR)  


 


Urine pH  6.5  (5.0-9.0)  


 


Ur Specific Gravity  1.020  (1.005-1.030)  


 


Urine Protein  >=300 H  (NEGATIVE)  


 


Urine Glucose (UA)  500 H  (NEGATIVE)  


 


Urine Ketones  Negative  (NEGATIVE)  


 


Urine Occult Blood  Trace-lysed H  (NEGATIVE)  


 


Urine Nitrite  Negative  (NEGATIVE)  


 


Urine Bilirubin  Negative  (NEGATIVE)  


 


Urine Urobilinogen  0.2  (0.2-1.0)  mg/dL


 


Ur Leukocyte Esterase  Negative  (NEGATIVE)  


 


Urine RBC  0-5  /HPF


 


Urine WBC  5-10 H  (0-5/HPF)  /HPF


 


Ur Epithelial Cells  Moderate H  /HPF


 


Urine Bacteria  Moderate H  (0-FEW/HPF)  /HPF


 


Urine Yeast  Few H  (0/HPF)  /HPF


 


Urine HCG, Qual   


 


Urine Opiates Screen   (NEGATIVE)  


 


Ur Oxycodone Screen   (NEGATIVE)  


 


Urine Methadone Screen   (NEGATIVE)  


 


Ur Barbiturates Screen   (NEGATIVE)  


 


U Tricyclic Antidepress   (NEGATIVE)  


 


Ur Phencyclidine Scrn   (NEGATIVE)  


 


Ur Amphetamine Screen   (NEGATIVE)  


 


U Methamphetamines Scrn   (NEGATIVE)  


 


Urine MDMA Screen   (NEGATIVE)  


 


U Benzodiazepines Scrn   (NEGATIVE)  


 


Urine Cocaine Screen   (NEGATIVE)  


 


U Marijuana (THC) Screen   (NEGATIVE)  


 


Ethyl Alcohol   mg/dL











Meds: 


Medications














Discontinued Medications














Generic Name Dose Route Start Last Admin





  Trade Name Genaro  PRN Reason Stop Dose Admin


 


Ketorolac Tromethamine  60 mg  18 17:19  18 17:38





  Toradol  IM  18 17:20  60 mg





  ONETIME ONE   Administration


 


Methylprednisolone Sodium Succinate  125 mg  18 17:19  18 17:38





  Solu-Medrol  IM  18 17:20  125 mg





  ONETIME ONE   Administration














- Radiology Interpretation


Free Text/Narrative:: 


Lumbar/Sacral/Coccyx xray: No acute findings


See rad report








Departure





- Departure


Time of Disposition: 18:15


Disposition: Home, Self-Care 01


Condition: Fair


Clinical Impression: 


 Low back pain








- Discharge Information


Instructions:  Sciatica, Easy-to-Read, Muscle Strain, Easy-to-Read


Forms:  ED Department Discharge


Additional Instructions: 


May ice or heat the area as tolerated


RX: Diclofenac, Medrol dose pack, take as directed


May use Tylenol as directed for pain as well


Follow up with your primary care facility








I have read and agree with the documentation that has been completed regarding 

this visit. By signing this record, I attest that the documentation was 

completed in my physical presence and is an accurate record of the encounter.

## 2018-03-08 NOTE — CT
Clinical history: 42-year-old female injured in fall (loss of consciousness). "Negative" exam 6 Janua
ry 2018 "unchanged" since 24 August 2017 CT of the head.

 

Scan technique: Volume acquisition of data emergency unenhanced CT scan of the head and brain obtaine
d while the patient was lying supine on the Siemens multi slice scanner [place], [place]. All data archived in the PACS system for storage, reformatting and study.

 

Interpretation: Mild mucoperiosteal thickening maxillary antra bilaterally. *Subtle new microvascular
 ischemic infarct periventricular white matter parietal lobe right cerebral hemisphere. CT exam head 
otherwise negative and unchanged compared earlier exams.

 

Uniformly thick bony calvarium without sign of skull fracture, underlying brain contusion or epidural
/subdural hematoma.

 

Symmetric gray-white matter pattern with underlying mirror-image normal ventricular system.

 

No supratentorial or posterior fossa mass lesion (physiologic midline pineal and symmetric choroid pl
exus calcifications).

 

No sign of acute intracerebral/intraventricular/subarachnoid bleed.

 

CONCLUSION: Subtle new microvascular ischemic infarct, right parietal lobe (axial scan slice #22). Bi
lateral maxillary sinusitis.

## 2018-03-08 NOTE — PCM.HP
H&P History of Present Illness





- General


Date of Service: 18


Admit Problem/Dx: 


 Admission Diagnosis/Problem





Admission Diagnosis/Problem      Fall








Source of Information: Patient, Provider (ER physician)


History Limitations: Reports: No Limitations





- History of Present Illness


Initial Comments - Free Text/Narative: 





The patient is a 42-year-old lady with a history of diabetes, hypertension, 

chronic pain, seizure disorder, prior stroke on aspirin and Plavix.





The patient was undergoing off on the stairs when she fell backwards. She 

denies any chest pain, lightheadedness, bleeding of seizure onset.


She fell backwards she hit her head. She might have had a few seconds of loss 

of consciousness but this was not observed.


Family was around and attended to her quickly. No seizure was observed.








- Related Data


Allergies/Adverse Reactions: 


 Allergies











Allergy/AdvReac Type Severity Reaction Status Date / Time


 


No Known Allergies Allergy   Verified 18 16:23











Home Medications: 


 Home Meds





Albuterol [Proair HFA] 2 puff INH ASDIRECTED PRN 14 [History]


Fluticasone Propionate [Flovent  MCG] 2 puff INH ASDIRECTED PRN 14 

[History]


Lisinopril 40 mg PO DAILY 14 [History]


sitaGLIPtin Phos/Metformin HCl [Janumet 50-1,000 MG] 1,000 mg PO DAILY 14 

[History]


Insulin Aspart [Novolog Flexpen] 30 units SQ TID PRN 16 [History]


Insulin Glarg,Human.Rec.Analog [Lantus] 30 units SQ BID 16 [History]


Gabapentin [Gabapentin] 600 mg PO TID 16 [History]


Aspirin [Ecotrin] 81 mg PO DAILY 17 [History]


Ibuprofen [Motrin] 800 mg PO ASDIRECTED PRN 17 [History]


Clopidogrel Bisulfate [Clopidogrel] 1 tab PO DAILY 18 [History]


Hydrochlorothiazide [Hydrochlorothiazide] 1 tab PO DAILY 18 [History]


Pregabalin [Lyrica] 1 tab PO TID 18 [History]


Rosuvastatin Calcium 1 tab PO BEDTIME 18 [History]


amLODIPine [Norvasc] 1 tab PO DAILY 18 [History]


levETIRAcetam [Keppra] 1 tab PO BID 18 [History]











Past Medical History


HEENT History: Reports: None


Cardiovascular History: Reports: Hypertension, Other (See Below)


Other Cardiovascular History: hx rhuematic fever


Respiratory History: Reports: Asthma


Gastrointestinal History: Reports: None


Genitourinary History: Reports: None


OB/GYN History: Reports: Pregnancy


Musculoskeletal History: Reports: Back Pain, Chronic, Osteoarthritis


Neurological History: Reports: Neuropathy, Diabetic, Neuropathy, Peripheral, 

Seizure, TIA


Other Neuro History: mini strokes


Psychiatric History: Reports: None


Endocrine/Metabolic History: Reports: Diabetes, Type II, IDDM, Obesity/BMI 30+


Hematologic History: Reports: None


Immunologic History: Reports: None


Oncologic (Cancer) History: Reports: None


Dermatologic History: Reports: None





- Infectious Disease History


Infectious Disease History: Reports: Chicken Pox





- Past Surgical History


Head Surgeries/Procedures: Reports: None


Female  Surgical History: Reports:  Section





Social & Family History





- Family History


Family Medical History: Noncontributory


Respiratory: Reports: Asthma


Other Respiratory Family Hisory: Brother


Endocrine/Metabolic: Reports: Diabetes, type II


Other Endocrine/Metabolic Family History: Brother





- Tobacco Use


Smoking Status *Q: Current Every Day Smoker


Years of Tobacco use: 20


Packs/Tins Daily: 0.1


Used Tobacco, but Quit: No


Month Tobacco Last Used: 02


Second Hand Smoke Exposure: Yes





- Caffeine Use


Caffeine Use: Reports: Soda





- Alcohol Use


Days Per Week of Alcohol Use: 0





- Recreational Drug Use


Recreational Drug Use: No





- Sexual History


Sexual History: Reports: Sexually Active, Single Partner





- Living Situation & Occupation


Living situation: Reports: with Significant Other, with Family


Occupation: Unemployed





H&P Review of Systems





- Review of Systems:


Review Of Systems: See Below


General: Denies: Fever


Pulmonary: Denies: Shortness of Breath


Cardiovascular: Denies: Chest Pain


Gastrointestinal: Denies: Abdominal Pain


Psychiatric: Denies: Confusion





Exam





- Exam


Exam: See Below





- Vital Signs


Vital Signs: 


 Last Vital Signs











Temp  36.6 C   18 18:54


 


Pulse  87   18 18:54


 


Resp  20   18 18:54


 


BP  106/62   18 18:54


 


Pulse Ox  98   18 18:54











Weight: 109.951 kg





- Exam


General: Alert, Oriented


Neck: Supple


Lungs: Clear to Auscultation, Normal Respiratory Effort


Cardiovascular: Regular Rate, Regular Rhythm


GI/Abdominal Exam: Normal Bowel Sounds, Soft, Non-Tender


Extremities: No Pedal Edema


Neurological: Cranial Nerves Intact, Strength Equal Bilateral, Other (Normal 

bilateral finger-to-nose test)


Neuro Extensive - Mental Status: Alert, Oriented x3, Normal Mood/Affect


Psychiatric: Alert, Normal Affect, Normal Mood





- Patient Data


Result Diagrams: 


 18 16:12





 18 16:12





*Q Meaningful Use (ADM)





- VTE *Q


VTE Criteria *Q: 








- Stroke *Q


Stroke Criteria *Q: 








- AMI *Q


AMI Criteria *Q: 








- Problem List


(1) Hypotension


SNOMED Code(s): 83705782


   ICD Code: I95.9 - HYPOTENSION, UNSPECIFIED   Status: Acute   Current Visit: 

Yes   





(2) Acute renal failure


SNOMED Code(s): 19397849


   ICD Code: N17.9 - ACUTE KIDNEY FAILURE, UNSPECIFIED   Status: Acute   

Current Visit: Yes   





(3) Diabetes mellitus type 2, uncontrolled


SNOMED Code(s): 76286272


   ICD Code: E11.65 - TYPE 2 DIABETES MELLITUS WITH HYPERGLYCEMIA   Status: 

Acute   Current Visit: Yes   


Qualifiers: 


   Diabetes mellitus complication status: with hyperglycemia   Diabetes 

mellitus long term insulin use: with long term use   Qualified Code(s): E11.65 

- Type 2 diabetes mellitus with hyperglycemia; Z79.4 - Long term (current) use 

of insulin; Z79.4 - Long term (current) use of insulin; Z79.4 - Long term (

current) use of insulin; Z79.4 - Long term (current) use of insulin   





(4) Fall at home


SNOMED Code(s): 85979938


   ICD Code: W19.XXXA - UNSPECIFIED FALL, INITIAL ENCOUNTER; Y92.009 - UNSP 

PLACE IN UNSP NON-INSTITUT (PRIVATE) RESIDENCE AS PLACE   Status: Acute   

Current Visit: Yes   


Qualifiers: 


   Encounter type: initial encounter   Qualified Code(s): W19.XXXA - 

Unspecified fall, initial encounter; Y92.009 - Unspecified place in unspecified 

non-institutional (private) residence as the place of occurrence of the 

external cause; Y92.009 - Unspecified place in unspecified non-institutional (

private) residence as the place of occurrence of the external cause   


Problem List Initiated/Reviewed/Updated: Yes


Orders Last 24hrs: 


 Active Orders 24 hr











 Category Date Time Status


 


 Patient Status [ADT] Routine ADT  18 19:22 Active


 


 Antiembolic Devices [RC] PER UNIT ROUTINE Care  18 19:25 Active


 


 Glucose [Blood Glucose Check, Bedside] [RC] QIDACANDBED Care  18 19:06 

Active


 


 Oxygen Therapy [RC] PRN Care  18 19:22 Active


 


 Telemetry Monitoring [Cardiac Monitoring] [RC] .AS Care  18 19:06 Active





 DIRECTED   


 


 Up With Assistance [RC] ASDIRECTED Care  18 19:22 Active


 


 VTE/DVT Education [RC] PER UNIT ROUTINE Care  18 19:22 Active


 


 Vital Signs [RC] Q4H Care  18 19:22 Active


 


 Consistent Carbohydrate Diet [DIET] Diet  18 Breakfast Active


 


 BASIC METABOLIC PANEL,BMP [CHEM] AM Lab  18 05:15 Ordered


 


 CBC WITH AUTO DIFF [HEME] AM Lab  18 05:15 Ordered


 


 Acetaminophen [Tylenol] Med  18 19:22 Active





 650 mg PO Q4H PRN   


 


 Albuterol [Proventil HFA] Med  18 19:20 Ordered





 DOSE gm INH ASDIRECTED PRN   


 


 Aspirin [Halfprin] Med  18 09:00 Active





 81 mg PO DAILY   


 


 Clopidogrel [Plavix] Med  18 09:00 Ordered





 DOSE mg PO DAILY   


 


 Gabapentin Med  18 21:00 Ordered





 600 mg PO TID   


 


 Heparin Sodium Med  18 22:00 Active





 5,000 units SUBCUT Q8HR   


 


 Insulin Aspart [NovoLOG] Med  18 21:00 Ordered





 10 unit SUBCUT TID   


 


 Insulin Aspart [NovoLOG] Med  18 08:00 Active





 See Protocol  SUBCUT TIDAC   


 


 Insulin Glarg,Human.Rec.Analog Med  18 21:00 Ordered





 30 units SQ BID   


 


 Lidocaine 5% [Lidoderm 5%] Med  18 19:45 Ordered





 700 mg TOP Q24H   


 


 Ondansetron [Zofran] Med  18 19:22 Active





 4 mg IVPUSH Q4H PRN   


 


 Rosuvastatin Calcium [Rosuvastatin Calcium] Med  18 21:00 Ordered





 1 tab PO BEDTIME   


 


 Sodium Chloride 0.9% [Normal Saline] 1,000 ml Med  18 19:30 Ordered





 IV ASDIRECTED   


 


 Zolpidem [Ambien] Med  18 19:22 Active





 5 mg PO BEDTIME PRN   


 


 levETIRAcetam [Keppra] Med  18 21:00 Active





 500 mg PO BID   


 


 Antiembolic Hose [OM.PC] Per Unit Routine Oth  18 19:24 Ordered


 


 K Pad [Heat Therapy] [OM.PC] Routine Oth  18 19:27 Ordered


 


 Resuscitation Status Routine Resus Stat  18 19:22 Ordered








 Medication Orders





Acetaminophen (Tylenol)  650 mg PO Q4H PRN


   PRN Reason: Pain (Mild 1-3)/fever


Albuterol (Proventil Hfa)   gm INH ASDIRECTED PRN


   PRN Reason: Dyspnea


Aspirin (Halfprin)  81 mg PO DAILY BALBIR


Clopidogrel Bisulfate (Plavix)   mg PO DAILY BALBIR


Heparin Sodium (Porcine) (Heparin Sodium)  5,000 units SUBCUT Q8HR BALBIR


Sodium Chloride (Normal Saline)  1,000 mls @ 125 mls/hr IV ASDIRECTED BALBIR


Insulin Aspart (Novolog)  0 unit SUBCUT TIDAC BALBIR


   PRN Reason: Protocol


Insulin Aspart (Novolog)  10 unit SUBCUT TID BALBIR


Levetiracetam (Keppra)  500 mg PO BID BALBIR


Lidocaine (Lidoderm 5%)  700 mg TOP Q24H BALBIR


Non-Formulary Medication (Gabapentin)  600 mg PO TID BALBIR


Non-Formulary Medication (Insulin Glarg,Human.Rec.Analog)  30 units SQ BID BALBIR


Ondansetron HCl (Zofran)  4 mg IVPUSH Q4H PRN


   PRN Reason: Nausea/Vomiting


Rosuvastatin Calcium (Crestor)  20 mg PO BEDTIME BALBIR


Sodium Chloride (Saline Flush)  10 ml FLUSH ASDIRECTED PRN


   PRN Reason: Keep Vein Open


   Last Admin: 18 16:22  Dose: 10 ml


Zolpidem Tartrate (Ambien)  5 mg PO BEDTIME PRN


   PRN Reason: Sleep








Assessment/Plan Comment:: 





Fall


It is unclear if the patient had a syncopal episode after the fall. She denies 

any symptoms prior to the fall. She was going up on the stairs and fell 

backwards hitting the head.


CT of the head shows no intracranial bleed.


He described subtle, new microvascular ischemic infarct, right parietal lobe  

new compared to 18


The patient appears to have a history of prior stroke.


The patient currently has normal neurologic deficit. I doubt that the patient 

had an acute stroke that caused the falling.


Well continue on Plavix, statin, aspirin





History of seizure disorder


No apparent seizure with this episode. Continue Keppra





Hypotension noted in the emergency room.


The patient has a history of hypertension. Was on lisinopril and Norvasc, hctz. 

I will hold these medications.








Acute renal failure


The patients admission creatinine is 2.2


From Epic system the patients last creatinine last September was around 1.


This might relate to uncontrolled diabetes, dehydration, possibly due to 

hypotension, ACE inhibitor, NSAIDS.


Treat with hydration. Hold blood pressure medications.


Follow blood sugars, electrolytes.


Hold  nonsteroidals that she has been taking for back pain





Uncontrolled diabetes


Given the patients creatinine elevation she is not a good candidate for 

metformin


Continue to treat with Levemir and mealtime Humalog


Monitor blood sugars and adjust as needed





Hyponatremia


This is pseudohyponatremia related to high blood sugars


Control diabetes


Hydrate with IV fluids





Abnormal urine analysis


I think this is a contaminated sample


Hold antibiotics





Chronic back pain


Try to avoid nonsteroidals due to renal failure


Try to avoid narcotics due to seizure disorder


Reveals Tylenol, Lidoderm, nonpharmaceutical, flexeril,  methods.





DVT prophylaxis will be with subcutaneous heparin

## 2018-03-08 NOTE — CT
Clinical history: 42-year-old 200 pound female frequently in the emergency department who is experien
cing pain right chest abdomen and hip associated with recent fall (CT head day reveals apparent "new 
microvascular infarct, right parietal lobe).

 

Scan technique: Volume acquisition of data from the chest, abdomen and pelvis including both hips obt
ained without oral contrast but during the intravenous administration 100 cc nonionic Isovue contrast
 (3 cc/s via injector) while patient was lying supine on the Siemens multi slice CT scanner [place], [place]. All data archived in the PACS system for storage and study.

 

Interpretation: Patchy multilobe atelectasis or bronchiectasis involving right upper and both lower l
obes.

No sign of rib fracture, underlying lung contusion, dependent pleural effusion or pneumothorax. 

 

Multilevel thoracic and lower lumbar disc disease associated hypertrophic arthritic changes of the sp
ine. 

No fracture or dislocation thoracolumbar spine or sacrum. No pelvic or either hip fracture/dislocatio
n. 

Symmetric spacing normal-appearing SI and hip joints.

 

Normal caliber thoracic/abdominal aorta and their major branches. No dissection or aneurysm.

 

Cholecystectomy. Fatty liver. Liver, stomach, spleen, pancreas, and both kidneys anatomically correct
 without sign of visceral laceration or rupture. (Isolated 2 cm diameter cyst or adenoma left adrenal
 gland) Normal cardiac silhouette without effusion.

 

Pancolonic diverticulosis. No sign of mechanical bowel obstruction and no pelvic/abdominal or retrope
ritoneal mass/hematoma.

 

CONCLUSION: No sign of skeletal fracture, hematoma, visceral laceration or mechanical bowel obstructi
on. 

No pneumothorax or free intraperitoneal air. No effusions or ascites. No cystectomy. Left adrenal les
ion. Diverticulosis colon.

## 2018-03-08 NOTE — EDM.PDOC
Scribed by Zulay Ochoa 18 1820 for Gumaro Kline MD





ED HPI GENERAL MEDICAL PROBLEM





- General


Chief Complaint: Trauma


Stated Complaint: EMS


Time Seen by Provider: 18 16:05


Source of Information: Reports: Patient, EMS, EMS Notes Reviewed, Old Records, 

RN, RN Notes Reviewed


History Limitations: Reports: No Limitations





- History of Present Illness


INITIAL COMMENTS - FREE TEXT/NARRATIVE: 


Pt arrives from home by ambulance with report that she fell backwards from the 

bottom step of her apartment as she was about to carry a bag of groceries up 

the stairs. Pt doesn't recall why she fell back. She states she hit her head 

and had a brief loss of consciousness that lasted "a few seconds". She also c/o 

pain to right hip, rt abd., and rt chest. Denies N/V, or leak of clear of 

bloody fluid from the nose or ears. Pt has Hx of seizures, but her  

witness the fall and reported to paramedics that he did not see any seizure 

like activity. Pt denies chest pain, dizziness, palpitations, syncope, or 

shortness of breath. She last checked her blood sugar this morning and it was 

239 which is "normal for the mornings". 





*Pt treated as a trauma upon arrival even though age <55yrs due to extensive 

and complicated medical history, head injury with loss of consciousness, neck, 

chest wall, and rt hip/pelvis pain and pt on plavix + aspirin tx.*








PRIMARY TRAUMA SURVEY:  (16:05HRS) Arrives by ambulance in full immobilization 

without long spinal board, c-collar or head blocked, or strapped. Pt awake, 

alert, oriented to person, place, and date. AIRWAY: Patent nasal and oral 

airways. Conversant with clear speech. BREATHING: Spontaneous respirations, 

with lungs clear to auscultation B/L. CIRCULATION: Good color, no cyanosis. 

Intact peripheral pulses at all 4 distal extremities, normal capillary refill 

time at all four extremities distal digits. Heart RRR, no murmur, no rub. 

DISABILITY/DEFORMITIES: No bleeding.  Tender to palpation with small minor 

bruising to B/L anterior knees, and very superficial abrasion to left anterior 

knee and proximal anterior lower leg. Tender to palpation of Rt hip and Rt 

mack pelvis, and Rt lateral chest wall. No upper or lower extremity deformities

, lacerations, swelling, discoloration, or other signs of injury. Mack pelvis 

intact, stable. Abdomen benign to exam. Chest non-tender anteriorly, no flail 

chest, crepitus, or subcutaneous emphysema. Neuro. grossly intact with pt. A&O 

x3, appropriate conversation, no confusion,  CN II-XII intact. No acute motor 

or sensory deficits, GCS 15 on arrival to ER. Skin clean, dry, warm, and 

intact. EXPOSURE: Pt was logged rolled with maintenance of c-spine 

immobilization, clothing/shirt was cut free and removed. No visible injury to 

back, no vertebral mack tenderness. C-spine protected by placement of C-collar 

by RN at 16:08HRS with C-spine immobilization maintained and pt in supine 

position on firm foam padded ER gurney.


Onset: Today


Duration: Constant


Location: Reports: Head, Neck, Chest, Abdomen, Pelvis, Lower Extremity, Right


Quality: Reports: Ache


Improves with: Reports: Immobilization


Worsens with: Reports: Movement


Context: Reports: Other (Fall)


Associated Symptoms: Reports: No Other Symptoms





- Related Data


 Allergies











Allergy/AdvReac Type Severity Reaction Status Date / Time


 


No Known Allergies Allergy   Verified 18 16:23











Home Meds: 


 Home Meds





Albuterol [Proair HFA] 2 puff INH ASDIRECTED PRN 14 [History]


Fluticasone Propionate [Flovent  MCG] 2 puff INH ASDIRECTED PRN 14 

[History]


Lisinopril 40 mg PO DAILY 14 [History]


sitaGLIPtin Phos/Metformin HCl [Janumet 50-1,000 MG] 1,000 mg PO DAILY 14 

[History]


Insulin Aspart [Novolog Flexpen] 30 units SQ TID PRN 16 [History]


Insulin Glarg,Human.Rec.Analog [Lantus] 30 units SQ BID 16 [History]


Gabapentin [Gabapentin] 600 mg PO TID 16 [History]


Aspirin [Ecotrin] 81 mg PO DAILY 17 [History]


Ibuprofen [Motrin] 800 mg PO ASDIRECTED PRN 17 [History]


Clopidogrel Bisulfate [Clopidogrel] 1 tab PO DAILY 18 [History]


Hydrochlorothiazide [Hydrochlorothiazide] 1 tab PO DAILY 18 [History]


Pregabalin [Lyrica] 1 tab PO TID 18 [History]


Rosuvastatin Calcium 1 tab PO BEDTIME 18 [History]


amLODIPine [Norvasc] 1 tab PO DAILY 18 [History]


levETIRAcetam [Keppra] 1 tab PO BID 18 [History]











Past Medical History


HEENT History: Reports: None


Cardiovascular History: Reports: Hypertension, Other (See Below)


Other Cardiovascular History: hx rhuematic fever


Respiratory History: Reports: Asthma


Gastrointestinal History: Reports: None


Genitourinary History: Reports: None


OB/GYN History: Reports: Pregnancy


Musculoskeletal History: Reports: Back Pain, Chronic, Osteoarthritis


Neurological History: Reports: Neuropathy, Diabetic, Neuropathy, Peripheral, 

Seizure, TIA


Other Neuro History: mini strokes


Psychiatric History: Reports: None


Endocrine/Metabolic History: Reports: Diabetes, Type II, IDDM, Obesity/BMI 30+


Hematologic History: Reports: None


Immunologic History: Reports: None


Oncologic (Cancer) History: Reports: None


Dermatologic History: Reports: None





- Infectious Disease History


Infectious Disease History: Reports: Chicken Pox





- Past Surgical History


Head Surgeries/Procedures: Reports: None


Female  Surgical History: Reports:  Section





Social & Family History





- Family History


Family Medical History: Noncontributory


Respiratory: Reports: Asthma


Other Respiratory Family Hisory: Brother


Endocrine/Metabolic: Reports: Diabetes, type II


Other Endocrine/Metabolic Family History: Brother





- Tobacco Use


Smoking Status *Q: Current Every Day Smoker


Years of Tobacco use: 20


Packs/Tins Daily: 0.1


Used Tobacco, but Quit: No


Month Tobacco Last Used: 02


Second Hand Smoke Exposure: Yes





- Caffeine Use


Caffeine Use: Reports: Soda





- Alcohol Use


Days Per Week of Alcohol Use: 0





- Recreational Drug Use


Recreational Drug Use: No





- Sexual History


Sexual History: Reports: Sexually Active, Single Partner





- Living Situation & Occupation


Living situation: Reports: with Significant Other, with Family


Occupation: Unemployed





Review of Systems





- Review of Systems


Review Of Systems: ROS reveals no pertinent complaints other than HPI.





ED EXAM, GENERAL





- Physical Exam


Exam: See Below


Free Text/Narrative:: 


SECONDARY TRAUMA SURVEY (16:57HRS)


Exam Limited By: No Limitations


General Appearance: Alert, WD/WN, No Apparent Distress, Obese


Eye Exam: Bilateral Eye: EOMI, PERRL


Ears: Normal External Exam, Normal Canal, Hearing Grossly Normal, Normal TMs


Ear Exam: Bilateral Ear: Auricle Normal, Canal Normal, TM normal


Nose: Normal Inspection, Normal Mucosa, No Blood


Throat/Mouth: Normal Inspection, Normal Lips, Normal Teeth, Normal Gums, Normal 

Oropharynx, Normal Voice, No Airway Compromise


Head: Normocephalic, Facial Tenderness (On secondary assessment patient has 

tenderness on palpation to right temple area. No bruising, redness, or open 

areas appreciated.)


Neck: Supple, Tender Midline, Other (C-spine cleared by CT scan, collar removed 

by me at 17:10HRS, neck exam after collar removal:Patient has tenderness 

midline on palpation. Tenderness to right lateral neck. No brusing noted. )


Respiratory/Chest: No Respiratory Distress, Lungs Clear, Normal Breath Sounds, 

No Accessory Muscle Use, Chest Non-Tender


Cardiovascular: Normal Peripheral Pulses, Regular Rate, Rhythm, No Edema, No 

Gallop, No JVD, No Murmur, No Rub


GI/Abdominal: Normal Bowel Sounds, Soft, No Organomegaly, No Distention, No 

Abnormal Bruit, No Mass, Tender (On secondary assessment patient reports pain 

to right lower abd. No brusing redness or abrasions noted)


 (Female) Exam: Deferred


Rectal (Female) Exam: Normal Exam, Normal Rectal Tone


Back Exam: Normal Inspection, Full Range of Motion


Extremities: Leg Pain, Other (Secondary assessment: Patient reports pain to 

bilateral knees. small abrasion noted to left knee. brusing to lateral aspect 

of right knee. Upper extremities no abnormalites noted. pulses strong.  )


Neurological: Alert, Oriented, CN II-XII Intact, Normal Cognition, Normal Gait, 

Normal Reflexes, No Motor/Sensory Deficits, Other (GCS 15 at one hour and at 

discharge)


Psychiatric: Normal Affect, Normal Mood


Skin Exam: Warm, Dry, Intact, Normal Color, No Rash


Lymphatic: No Adenopathy





EKG INTERPRETATION


EKG Date: 18


Time: 16:15


Rhythm: Other (sinus tachycardia)


Rate (Beats/Min): 102


Axis: LAD-Left Axis Deviation (borderline)


P-Wave: Present


QRS: Other (borderline R wave progression, anterior leads.)


ST-T: Normal


QT: Normal





Course





- Vital Signs


Last Recorded V/S: 


 Last Vital Signs











Temp  35.7 C   18 16:03


 


Pulse  102 H  18 16:15


 


Resp  20   18 16:15


 


BP  81/39 L  18 16:36


 


Pulse Ox  95   18 16:15














- Orders/Labs/Meds


Orders: 


 Active Orders 24 hr











 Category Date Time Status


 


 Blood Glucose Check, Bedside [] ONETIME Care  18 16:14 Active


 


 C Collar Applied [Spinal Immobilization] [RC] Care  18 16:16 Active





 ASDIRECTED   


 


 EKG 12 Lead [EKG Documentation Completion] [RC] STAT Care  18 16:12 

Active


 


 Peripheral IV Care [] .AS DIRECTED Care  18 16:15 Active


 


 KEPPRA [REF] Stat Lab  18 15:12 Received


 


 Sodium Chloride 0.9% [Normal Saline] 1,000 ml Med  18 17:46 Active





 IV .BOLUS   


 


 Sodium Chloride 0.9% [Saline Flush] Med  18 16:15 Active





 10 ml FLUSH ASDIRECTED PRN   


 


 Peripheral IV Insertion Adult [OM.PC] Stat Oth  18 16:12 Ordered








 Medication Orders





Sodium Chloride (Normal Saline)  1,000 mls @ 999 mls/hr IV .BOLUS ONE


   Stop: 18 18:46


   Last Admin: 18 18:11  Dose: 999 mls/hr


Sodium Chloride (Saline Flush)  10 ml FLUSH ASDIRECTED PRN


   PRN Reason: Keep Vein Open


   Last Admin: 18 16:22  Dose: 10 ml








Labs: 


 Laboratory Tests











  18 Range/Units





  16:12 16:12 16:12 


 


WBC  11.7 H    (5.0-10.0)  10^3/uL


 


RBC  4.38    (4.2-5.4)  10^6/uL


 


Hgb  12.8    (12.0-16.0)  g/dL


 


Hct  36.7 L    (37.0-47.0)  %


 


MCV  83.8    ()  fL


 


MCH  29.2    (27.0-34.0)  pg


 


MCHC  34.9    (33.0-35.0)  g/dL


 


Plt Count  274    (150-450)  10^3/uL


 


Neut % (Auto)  69.4    (42.2-75.2)  %


 


Lymph % (Auto)  20.5    (20.5-50.1)  %


 


Mono % (Auto)  4.9    (2-8)  %


 


Eos % (Auto)  4.9 H    (1.0-3.0)  %


 


Baso % (Auto)  0.3    (0.0-1.0)  %


 


PT   9.2   (9.0-12.0)  SEC


 


INR   0.9   (0.9-1.2)  


 


APTT   23.0   (22.0-34.0)  SEC


 


Sodium    129 L  (135-145)  mmol/L


 


Potassium    4.4  (3.6-5.0)  mmol/L


 


Chloride    95 L  (101-111)  mmol/L


 


Carbon Dioxide    21.0  (21.0-31.0)  mmol/L


 


Anion Gap    17.4  


 


BUN    27 H  (7-18)  mg/dL


 


Creatinine    2.2 H  (0.6-1.3)  mg/dL


 


Est Cr Clr Drug Dosing    32.92  mL/min


 


Estimated GFR (MDRD)    24  


 


BUN/Creatinine Ratio    12.27  


 


Glucose    411 H*  ()  mg/dL


 


POC Glucose     ()  mg/dl


 


Calcium    8.6  (8.4-10.2)  mg/dl


 


Total Bilirubin    0.7  (0.2-1.0)  mg/dL


 


AST    28  (10-42)  IU/L


 


ALT    10  (10-60)  IU/L


 


Alkaline Phosphatase    83  ()  IU/L


 


Troponin I    < 0.02  (0.00-0.02)  ng/ml


 


Total Protein    7.9  (6.7-8.2)  g/dl


 


Albumin    3.5  (3.2-5.5)  g/dl


 


Globulin    4.4  


 


Albumin/Globulin Ratio    0.80  


 


Amylase    43  ()  U/L


 


Lipase    40  (22-51)  U/L


 


Urine Color     (YELLOW)  


 


Urine Appearance     (CLEAR)  


 


Urine pH     (5.0-9.0)  


 


Ur Specific Gravity     (1.005-1.030)  


 


Urine Protein     (NEGATIVE)  


 


Urine Glucose (UA)     (NEGATIVE)  


 


Urine Ketones     (NEGATIVE)  


 


Urine Occult Blood     (NEGATIVE)  


 


Urine Nitrite     (NEGATIVE)  


 


Urine Bilirubin     (NEGATIVE)  


 


Urine Urobilinogen     (0.2-1.0)  mg/dL


 


Ur Leukocyte Esterase     (NEGATIVE)  


 


Urine RBC     /HPF


 


Urine WBC     (0-5/HPF)  /HPF


 


Ur Epithelial Cells     /HPF


 


Urine Bacteria     (0-FEW/HPF)  /HPF


 


Urine HCG, Qual     


 


Urine Opiates Screen     (NEGATIVE)  


 


Ur Oxycodone Screen     (NEGATIVE)  


 


Urine Methadone Screen     (NEGATIVE)  


 


Ur Barbiturates Screen     (NEGATIVE)  


 


U Tricyclic Antidepress     (NEGATIVE)  


 


Ur Phencyclidine Scrn     (NEGATIVE)  


 


Ur Amphetamine Screen     (NEGATIVE)  


 


U Methamphetamines Scrn     (NEGATIVE)  


 


Urine MDMA Screen     (NEGATIVE)  


 


U Benzodiazepines Scrn     (NEGATIVE)  


 


Urine Cocaine Screen     (NEGATIVE)  


 


U Marijuana (THC) Screen     (NEGATIVE)  


 


Ethyl Alcohol    < 5  mg/dL


 


Ketones     














  18 Range/Units





  16:12 16:20 18:00 


 


WBC     (5.0-10.0)  10^3/uL


 


RBC     (4.2-5.4)  10^6/uL


 


Hgb     (12.0-16.0)  g/dL


 


Hct     (37.0-47.0)  %


 


MCV     ()  fL


 


MCH     (27.0-34.0)  pg


 


MCHC     (33.0-35.0)  g/dL


 


Plt Count     (150-450)  10^3/uL


 


Neut % (Auto)     (42.2-75.2)  %


 


Lymph % (Auto)     (20.5-50.1)  %


 


Mono % (Auto)     (2-8)  %


 


Eos % (Auto)     (1.0-3.0)  %


 


Baso % (Auto)     (0.0-1.0)  %


 


PT     (9.0-12.0)  SEC


 


INR     (0.9-1.2)  


 


APTT     (22.0-34.0)  SEC


 


Sodium     (135-145)  mmol/L


 


Potassium     (3.6-5.0)  mmol/L


 


Chloride     (101-111)  mmol/L


 


Carbon Dioxide     (21.0-31.0)  mmol/L


 


Anion Gap     


 


BUN     (7-18)  mg/dL


 


Creatinine     (0.6-1.3)  mg/dL


 


Est Cr Clr Drug Dosing     mL/min


 


Estimated GFR (MDRD)     


 


BUN/Creatinine Ratio     


 


Glucose     ()  mg/dL


 


POC Glucose   429 H*   ()  mg/dl


 


Calcium     (8.4-10.2)  mg/dl


 


Total Bilirubin     (0.2-1.0)  mg/dL


 


AST     (10-42)  IU/L


 


ALT     (10-60)  IU/L


 


Alkaline Phosphatase     ()  IU/L


 


Troponin I     (0.00-0.02)  ng/ml


 


Total Protein     (6.7-8.2)  g/dl


 


Albumin     (3.2-5.5)  g/dl


 


Globulin     


 


Albumin/Globulin Ratio     


 


Amylase     ()  U/L


 


Lipase     (22-51)  U/L


 


Urine Color    Yellow  (YELLOW)  


 


Urine Appearance    Cloudy  (CLEAR)  


 


Urine pH    5.0  (5.0-9.0)  


 


Ur Specific Gravity    1.025  (1.005-1.030)  


 


Urine Protein    >=300 H  (NEGATIVE)  


 


Urine Glucose (UA)    500 H  (NEGATIVE)  


 


Urine Ketones    Negative  (NEGATIVE)  


 


Urine Occult Blood    Trace-intact H  (NEGATIVE)  


 


Urine Nitrite    Negative  (NEGATIVE)  


 


Urine Bilirubin    Small H  (NEGATIVE)  


 


Urine Urobilinogen    1.0  (0.2-1.0)  mg/dL


 


Ur Leukocyte Esterase    Small H  (NEGATIVE)  


 


Urine RBC    0-5  /HPF


 


Urine WBC    Packed H  (0-5/HPF)  /HPF


 


Ur Epithelial Cells    Moderate H  /HPF


 


Urine Bacteria    Many H  (0-FEW/HPF)  /HPF


 


Urine HCG, Qual     


 


Urine Opiates Screen     (NEGATIVE)  


 


Ur Oxycodone Screen     (NEGATIVE)  


 


Urine Methadone Screen     (NEGATIVE)  


 


Ur Barbiturates Screen     (NEGATIVE)  


 


U Tricyclic Antidepress     (NEGATIVE)  


 


Ur Phencyclidine Scrn     (NEGATIVE)  


 


Ur Amphetamine Screen     (NEGATIVE)  


 


U Methamphetamines Scrn     (NEGATIVE)  


 


Urine MDMA Screen     (NEGATIVE)  


 


U Benzodiazepines Scrn     (NEGATIVE)  


 


Urine Cocaine Screen     (NEGATIVE)  


 


U Marijuana (THC) Screen     (NEGATIVE)  


 


Ethyl Alcohol     mg/dL


 


Ketones  Negative    














  18 Range/Units





  18:00 18:00 


 


WBC    (5.0-10.0)  10^3/uL


 


RBC    (4.2-5.4)  10^6/uL


 


Hgb    (12.0-16.0)  g/dL


 


Hct    (37.0-47.0)  %


 


MCV    ()  fL


 


MCH    (27.0-34.0)  pg


 


MCHC    (33.0-35.0)  g/dL


 


Plt Count    (150-450)  10^3/uL


 


Neut % (Auto)    (42.2-75.2)  %


 


Lymph % (Auto)    (20.5-50.1)  %


 


Mono % (Auto)    (2-8)  %


 


Eos % (Auto)    (1.0-3.0)  %


 


Baso % (Auto)    (0.0-1.0)  %


 


PT    (9.0-12.0)  SEC


 


INR    (0.9-1.2)  


 


APTT    (22.0-34.0)  SEC


 


Sodium    (135-145)  mmol/L


 


Potassium    (3.6-5.0)  mmol/L


 


Chloride    (101-111)  mmol/L


 


Carbon Dioxide    (21.0-31.0)  mmol/L


 


Anion Gap    


 


BUN    (7-18)  mg/dL


 


Creatinine    (0.6-1.3)  mg/dL


 


Est Cr Clr Drug Dosing    mL/min


 


Estimated GFR (MDRD)    


 


BUN/Creatinine Ratio    


 


Glucose    ()  mg/dL


 


POC Glucose    ()  mg/dl


 


Calcium    (8.4-10.2)  mg/dl


 


Total Bilirubin    (0.2-1.0)  mg/dL


 


AST    (10-42)  IU/L


 


ALT    (10-60)  IU/L


 


Alkaline Phosphatase    ()  IU/L


 


Troponin I    (0.00-0.02)  ng/ml


 


Total Protein    (6.7-8.2)  g/dl


 


Albumin    (3.2-5.5)  g/dl


 


Globulin    


 


Albumin/Globulin Ratio    


 


Amylase    ()  U/L


 


Lipase    (22-51)  U/L


 


Urine Color    (YELLOW)  


 


Urine Appearance    (CLEAR)  


 


Urine pH    (5.0-9.0)  


 


Ur Specific Gravity    (1.005-1.030)  


 


Urine Protein    (NEGATIVE)  


 


Urine Glucose (UA)    (NEGATIVE)  


 


Urine Ketones    (NEGATIVE)  


 


Urine Occult Blood    (NEGATIVE)  


 


Urine Nitrite    (NEGATIVE)  


 


Urine Bilirubin    (NEGATIVE)  


 


Urine Urobilinogen    (0.2-1.0)  mg/dL


 


Ur Leukocyte Esterase    (NEGATIVE)  


 


Urine RBC    /HPF


 


Urine WBC    (0-5/HPF)  /HPF


 


Ur Epithelial Cells    /HPF


 


Urine Bacteria    (0-FEW/HPF)  /HPF


 


Urine HCG, Qual  Negative   


 


Urine Opiates Screen   Negative  (NEGATIVE)  


 


Ur Oxycodone Screen   Negative  (NEGATIVE)  


 


Urine Methadone Screen   Negative  (NEGATIVE)  


 


Ur Barbiturates Screen   Negative  (NEGATIVE)  


 


U Tricyclic Antidepress   Negative  (NEGATIVE)  


 


Ur Phencyclidine Scrn   Negative  (NEGATIVE)  


 


Ur Amphetamine Screen   Negative  (NEGATIVE)  


 


U Methamphetamines Scrn   Negative  (NEGATIVE)  


 


Urine MDMA Screen   Negative  (NEGATIVE)  


 


U Benzodiazepines Scrn   Negative  (NEGATIVE)  


 


Urine Cocaine Screen   Negative  (NEGATIVE)  


 


U Marijuana (THC) Screen   Negative  (NEGATIVE)  


 


Ethyl Alcohol    mg/dL


 


Ketones    











Meds: 


Medications











Generic Name Dose Route Start Last Admin





  Trade Name Freq  PRN Reason Stop Dose Admin


 


Sodium Chloride  1,000 mls @ 999 mls/hr  18 17:46  18 18:11





  Normal Saline  IV  18 18:46  999 mls/hr





  .BOLUS ONE   Administration


 


Sodium Chloride  10 ml  18 16:15  18 16:22





  Saline Flush  FLUSH   10 ml





  ASDIRECTED PRN   Administration





  Keep Vein Open   














Discontinued Medications














Generic Name Dose Route Start Last Admin





  Trade Name Freq  PRN Reason Stop Dose Admin


 


Sodium Chloride  1,000 mls @ 999 mls/hr  18 16:25  18 16:10





  Normal Saline  IV  18 17:25  999 mls/hr





  .BOLUS ONE   Administration


 


Iopamidol  100 ml  18 16:26  18 16:30





  Isovue-300 (61%)  IVPUSH  18 16:27  100 ml





  ONETIME ONE   Administration














- Radiology Interpretation


Free Text/Narrative:: 


CT cervical spine:  Arthritis. No fractures or dislocations cervical spine. See 

rad report. 





CT head:  Subtle new microvascular ischemic infarct, right parietal lobe(axial 

scan slice#22) new since comparison study. Bilateral maxillary sinusitis. See 

rad report. 





CT abdomen, pelvis and chest: No sign of skeletal fracture, hematoma, visceral 

laceration or mechanical bowel obstruction. No pneumothorax or free 

intraperitoneal air. No effusions or ascites. No cystectomy. Left adrenal 

lesion. Diverticulosis colon. See rad report. 


CT Results Date: 18





Departure





- Departure


Time of Disposition: 18:42 ((admit to Dr. Carson))


Disposition: Refer to Observation


Condition: Undetermined


Clinical Impression: 


 Hyponatremia





Hypotension


Qualifiers:


 Hypotension type: unspecified hypotension type Qualified Code(s): I95.9 - 

Hypotension, unspecified





Concussion


Qualifiers:


 Encounter type: initial encounter Loss of consciousness presence/duration: 

with LOC of 30 min or less Qualified Code(s): S06.0X1A - Concussion with loss 

of consciousness of 30 minutes or less, initial encounter





Fall at home


Qualifiers:


 Encounter type: initial encounter Qualified Code(s): W19.XXXA - Unspecified 

fall, initial encounter; Y92.009 - Unspecified place in unspecified non-

institutional (private) residence as the place of occurrence of the external 

cause; Y92.009 - Unspecified place in unspecified non-institutional (private) 

residence as the place of occurrence of the external cause





CVA (cerebral vascular accident)


Qualifiers:


 CVA mechanism: other Qualified Code(s): I63.8 - Other cerebral infarction





Diabetes mellitus type 2, uncontrolled


Qualifiers:


 Diabetes mellitus complication status: with hyperglycemia Diabetes mellitus 

long term insulin use: with long term use Qualified Code(s): E11.65 - Type 2 

diabetes mellitus with hyperglycemia; Z79.4 - Long term (current) use of insulin

; Z79.4 - Long term (current) use of insulin; Z79.4 - Long term (current) use 

of insulin; Z79.4 - Long term (current) use of insulin








- Discharge Information





- My Orders


Last 24 Hours: 


My Active Orders





18 15:12


KEPPRA [REF] Stat 





18 16:12


EKG 12 Lead [EKG Documentation Completion] [RC] STAT 


Peripheral IV Insertion Adult [OM.PC] Stat 





18 16:14


Blood Glucose Check, Bedside [RC] ONETIME 





18 16:15


Peripheral IV Care [RC] .AS DIRECTED 


Sodium Chloride 0.9% [Saline Flush]   10 ml FLUSH ASDIRECTED PRN 





18 16:16


C Collar Applied [Spinal Immobilization] [RC] ASDIRECTED 





18 17:46


Sodium Chloride 0.9% [Normal Saline] 1,000 ml IV .BOLUS 














- Assessment/Plan


Last 24 Hours: 


My Active Orders





18 15:12


KEPPRA [REF] Stat 





18 16:12


EKG 12 Lead [EKG Documentation Completion] [RC] STAT 


Peripheral IV Insertion Adult [OM.PC] Stat 





18 16:14


Blood Glucose Check, Bedside [RC] ONETIME 





18 16:15


Peripheral IV Care [RC] .AS DIRECTED 


Sodium Chloride 0.9% [Saline Flush]   10 ml FLUSH ASDIRECTED PRN 





18 16:16


C Collar Applied [Spinal Immobilization] [RC] ASDIRECTED 





18 17:46


Sodium Chloride 0.9% [Normal Saline] 1,000 ml IV .BOLUS 














I have read and agree with the documentation that has been completed regarding 

this visit. By signing this record, I attest that the documentation was 

completed in my physical presence and is an accurate record of the encounter.

## 2018-03-08 NOTE — CT
Clinical history: 42-year-old female injured in fall (loss of consciousness).

 

Scan technique: Volume acquisition of data emergency unenhanced CT scan of the cervical spine obtaine
d while the patient was lying supine on the Siemens multi slice scanner [place], [place]. All data archived in the PAC system for storage, reformatting and study.

 

Interpretation: 

Early signs of multilevel disc disease with reactive atlantoaxial sclerosis and hypertrophic marginal
 spondylosis (entire cervical and upper thoracic spine).

 

*No prevertebral soft tissue swelling, cervical fracture or spondylolisthesis. No jumped locked facet
s.

 

First 2 ribs on both sides unremarkable. Lung apices clear. No basal skull fracture.

 

CONCLUSION: Arthritis. No fractures or dislocations cervical spine.

## 2018-03-09 NOTE — PCM.PN
- General Info


Date of Service: 03/09/18


Admission Dx/Problem (Free Text): 


 Admission Diagnosis/Problem





Admission Diagnosis/Problem      Fall








Subjective Update: 





blood sugars have been significantly elevated.


Complaining of some moderate back pain, progressive movements, since the 

falling.


Somewhat better with heat pad and Tylenol but not Completely relieved.


No nausea or vomiting.


Functional Status: Reports: Tolerating Diet.  Denies: Pain Controlled





- Review of Systems


General: Denies: Fever


Pulmonary: Denies: Shortness of Breath


Cardiovascular: Denies: Chest Pain


Gastrointestinal: Denies: Abdominal Pain, Vomiting


Genitourinary: Denies: Dysuria


Neurological: Denies: Confusion





- Patient Data


Vitals - Most Recent: 


 Last Vital Signs











Temp  36.2 C   03/09/18 03:00


 


Pulse  95   03/09/18 03:00


 


Resp  18   03/09/18 03:00


 


BP  110/71   03/09/18 03:00


 


Pulse Ox  98   03/09/18 03:00











Weight - Most Recent: 109.951 kg


I&O - Last 24 Hours: 


 Intake & Output











 03/08/18 03/09/18 03/09/18





 22:59 06:59 14:59


 


Intake Total 360 1000 


 


Balance 360 1000 











Lab Results Last 24 Hours: 


 Laboratory Results - last 24 hr











  03/08/18 03/09/18 03/09/18 Range/Units





  21:10 06:10 06:10 


 


WBC   11.9 H   (5.0-10.0)  10^3/uL


 


RBC   4.00 L   (4.2-5.4)  10^6/uL


 


Hgb   11.7 L   (12.0-16.0)  g/dL


 


Hct   33.6 L   (37.0-47.0)  %


 


MCV   84.0   ()  fL


 


MCH   29.3   (27.0-34.0)  pg


 


MCHC   34.8   (33.0-35.0)  g/dL


 


Plt Count   257   (150-450)  10^3/uL


 


Neut % (Auto)   73.4   (42.2-75.2)  %


 


Lymph % (Auto)   16.0 L   (20.5-50.1)  %


 


Mono % (Auto)   6.0   (2-8)  %


 


Eos % (Auto)   4.3 H   (1.0-3.0)  %


 


Baso % (Auto)   0.3   (0.0-1.0)  %


 


Sodium    130 L  (135-145)  mmol/L


 


Potassium    4.6  (3.6-5.0)  mmol/L


 


Chloride    101  (101-111)  mmol/L


 


Carbon Dioxide    20.0 L  (21.0-31.0)  mmol/L


 


Anion Gap    13.6  


 


BUN    30 H  (7-18)  mg/dL


 


Creatinine    1.4 H  (0.6-1.3)  mg/dL


 


Est Cr Clr Drug Dosing    51.73  mL/min


 


Estimated GFR (MDRD)    41  


 


Glucose    511 H*  ()  mg/dL


 


POC Glucose  452 H*    ()  mg/dl


 


Calcium    8.3 L  (8.4-10.2)  mg/dl














  03/09/18 Range/Units





  08:08 


 


WBC   (5.0-10.0)  10^3/uL


 


RBC   (4.2-5.4)  10^6/uL


 


Hgb   (12.0-16.0)  g/dL


 


Hct   (37.0-47.0)  %


 


MCV   ()  fL


 


MCH   (27.0-34.0)  pg


 


MCHC   (33.0-35.0)  g/dL


 


Plt Count   (150-450)  10^3/uL


 


Neut % (Auto)   (42.2-75.2)  %


 


Lymph % (Auto)   (20.5-50.1)  %


 


Mono % (Auto)   (2-8)  %


 


Eos % (Auto)   (1.0-3.0)  %


 


Baso % (Auto)   (0.0-1.0)  %


 


Sodium   (135-145)  mmol/L


 


Potassium   (3.6-5.0)  mmol/L


 


Chloride   (101-111)  mmol/L


 


Carbon Dioxide   (21.0-31.0)  mmol/L


 


Anion Gap   


 


BUN   (7-18)  mg/dL


 


Creatinine   (0.6-1.3)  mg/dL


 


Est Cr Clr Drug Dosing   mL/min


 


Estimated GFR (MDRD)   


 


Glucose   ()  mg/dL


 


POC Glucose  425 H*  ()  mg/dl


 


Calcium   (8.4-10.2)  mg/dl











Med Orders - Current: 


 Current Medications





Acetaminophen (Tylenol)  650 mg PO Q4H PRN


   PRN Reason: Pain (Mild 1-3)/fever


   Last Admin: 03/09/18 06:41 Dose:  650 mg


Hydrocodone Bitart/Acetaminophen (Norco 325-5 Mg)  1 tab PO Q4H PRN


   PRN Reason: severe pain


Albuterol (Proventil Hfa)  0 gm INH Q4H PRN


   PRN Reason: Shortness of Breath


Aspirin (Halfprin)  81 mg PO DAILY [facility]


   Last Admin: 03/09/18 09:08 Dose:  81 mg


Clopidogrel Bisulfate (Plavix)  75 mg PO DAILY [facility]


   Last Admin: 03/09/18 09:08 Dose:  75 mg


Gabapentin (Neurontin)  300 mg PO TID [facility]


   Last Admin: 03/09/18 09:07 Dose:  300 mg


Heparin Sodium (Porcine) (Heparin Sodium)  5,000 units SUBCUT Q8HR [facility]


   Last Admin: 03/09/18 06:24 Dose:  5,000 units


Sodium Chloride (Normal Saline)  1,000 mls @ 125 mls/hr IV ASDIRECTED [facility]


   Last Admin: 03/09/18 03:56 Dose:  125 mls/hr


Insulin Aspart (Novolog)  0 unit SUBCUT TIDAC [facility]


   PRN Reason: Protocol


   Last Admin: 03/09/18 09:08 Dose:  8 units


Insulin Aspart (Novolog)  10 unit SUBCUT TID [facility]


   Last Admin: 03/09/18 09:09 Dose:  10 units


Insulin Detemir (Levemir)  40 unit SUBCUT BID [facility]


Levetiracetam (Keppra)  500 mg PO BID [facility]


   Last Admin: 03/09/18 09:08 Dose:  500 mg


Lidocaine (Lidoderm 5%)  700 mg TOP Q24H [facility]


   Last Admin: 03/08/18 20:51 Dose:  700 mg


Miscellaneous Information (Remove Patch)  0 ea TRDERM DAILY@0800 [facility]


   Last Admin: 03/09/18 09:15 Dose:  1 ea


Ondansetron HCl (Zofran)  4 mg IVPUSH Q4H PRN


   PRN Reason: Nausea/Vomiting


Rosuvastatin Calcium (Crestor)  20 mg PO BEDTIME [facility]


   Last Admin: 03/08/18 20:51 Dose:  20 mg


Sodium Chloride (Saline Flush)  10 ml FLUSH ASDIRECTED PRN


   PRN Reason: Keep Vein Open


   Last Admin: 03/08/18 16:22 Dose:  10 ml


Zolpidem Tartrate (Ambien)  5 mg PO BEDTIME PRN


   PRN Reason: Sleep


   Last Admin: 03/08/18 22:36 Dose:  5 mg





Discontinued Medications





Albuterol (Proventil Hfa)  0 gm INH ASDIRECTED PRN


   PRN Reason: Dyspnea


Gabapentin (Neurontin)  600 mg PO TID [facility]


   Last Admin: 03/08/18 23:19 Dose:  Not Given


Gabapentin (Neurontin)  100 mg PO ONETIME ONE


   Stop: 03/08/18 22:24


   Last Admin: 03/08/18 22:43 Dose:  100 mg


Sodium Chloride (Normal Saline)  1,000 mls @ 999 mls/hr IV .BOLUS ONE


   Stop: 03/08/18 17:25


   Last Admin: 03/08/18 16:10 Dose:  999 mls/hr


Sodium Chloride (Normal Saline)  1,000 mls @ 999 mls/hr IV .BOLUS ONE


   Stop: 03/08/18 18:46


   Last Admin: 03/08/18 18:11 Dose:  999 mls/hr


Insulin Aspart (Novolog)  10 unit SUBCUT ONETIME ONE


   Stop: 03/08/18 21:19


   Last Admin: 03/08/18 22:38 Dose:  10 units


Insulin Aspart (Novolog)  20 unit SUBCUT ONETIME ONE


   Stop: 03/09/18 07:43


   Last Admin: 03/09/18 08:10 Dose:  20 units


Insulin Detemir (Levemir)  30 unit SUBCUT BID [facility]


   Last Admin: 03/09/18 09:10 Dose:  30 units


Insulin Detemir (Levemir)  40 unit SUBCUT BID [facility]


Insulin Human NPH (Novolin N)  10 unit SUBCUT ONETIME ONE


   Stop: 03/09/18 10:52


Iopamidol (Isovue-300 (61%))  100 ml IVPUSH ONETIME ONE


   Stop: 03/08/18 16:27


   Last Admin: 03/08/18 16:30 Dose:  100 ml











- Exam


General: Alert, Oriented


Neck: Supple


Lungs: Clear to Auscultation, Normal Respiratory Effort


Cardiovascular: Regular Rate, Regular Rhythm


GI/Abdominal Exam: Normal Bowel Sounds, Soft, Non-Tender


Extremities: No Pedal Edema





- Problem List & Annotations


(1) Hypotension


SNOMED Code(s): 04091156


   Code(s): I95.9 - HYPOTENSION, UNSPECIFIED   Status: Acute   Current Visit: 

Yes   





(2) Acute renal failure


SNOMED Code(s): 83126059


   Code(s): N17.9 - ACUTE KIDNEY FAILURE, UNSPECIFIED   Status: Acute   Current 

Visit: Yes   





(3) Diabetes mellitus type 2, uncontrolled


SNOMED Code(s): 31387272


   Code(s): E11.65 - TYPE 2 DIABETES MELLITUS WITH HYPERGLYCEMIA   Status: 

Acute   Current Visit: Yes   


Qualifiers: 


   Diabetes mellitus complication status: with hyperglycemia   Diabetes 

mellitus long term insulin use: with long term use   Qualified Code(s): E11.65 

- Type 2 diabetes mellitus with hyperglycemia; Z79.4 - Long term (current) use 

of insulin; Z79.4 - Long term (current) use of insulin; Z79.4 - Long term (

current) use of insulin; Z79.4 - Long term (current) use of insulin   





(4) Fall at home


SNOMED Code(s): 95025486


   Code(s): W19.XXXA - UNSPECIFIED FALL, INITIAL ENCOUNTER; Y92.009 - UNSP 

PLACE IN UNSP NON-INSTITUT (PRIVATE) RESIDENCE AS PLACE   Status: Acute   

Current Visit: Yes   


Qualifiers: 


   Encounter type: initial encounter   Qualified Code(s): W19.XXXA - 

Unspecified fall, initial encounter; Y92.009 - Unspecified place in unspecified 

non-institutional (private) residence as the place of occurrence of the 

external cause; Y92.009 - Unspecified place in unspecified non-institutional (

private) residence as the place of occurrence of the external cause   





- Problem List Review


Problem List Initiated/Reviewed/Updated: Yes





- My Orders


Last 24 Hours: 


My Active Orders





03/08/18 19:27


K Pad [Heat Therapy] [OM.PC] Routine 





03/08/18 19:30


Sodium Chloride 0.9% [Normal Saline] 1,000 ml IV ASDIRECTED 





03/08/18 20:00


Lidocaine 5% [Lidoderm 5%]   700 mg TOP Q24H 





03/08/18 21:44


Albuterol [Proventil HFA]   0 gm INH Q4H PRN 





03/08/18 21:45


RT Post Treatment Assessment [RC] Click to Edit 


RT Pre-Treatment Assessment [RC] Click to Edit 





03/09/18 08:00


Remove Patch   0 ea TRDERM DAILY@0800 





03/09/18 09:00


Gabapentin [Neurontin]   300 mg PO TID 





03/09/18 10:53


Acetaminophen/HYDROcodone [Norco 325-5 MG]   1 tab PO Q4H PRN 





03/09/18 21:00


Insulin Detemir [Levemir]   40 unit SUBCUT BID 














- Plan


Plan:: 





Fall


It is unclear if the patient had a syncopal episode after the fall. She denies 

any symptoms prior to the fall. She was going up on the stairs and fell 

backwards hitting the head.





CT of the head shows no intracranial bleed. 


He described subtle, new microvascular ischemic infarct, right parietal lobe  

new compared to 1/6/18


The patient appears to have a history of prior stroke.


The patient currently has normal neurologic deficit. I doubt that the patient 

had an acute stroke that caused the falling.


Well continue on Plavix, statin, aspirin





History of seizure disorder


No apparent seizure with this episode. Continue Keppra





Hypotension noted in the emergency room.


The patient has a history of hypertension. Was on lisinopril and Norvasc, hctz. 

I will hold these medications.








Acute renal failure


The patients admission creatinine is 2.2


From Epic system the patients last creatinine last September was around 1.


improved since admission


This might relate to uncontrolled diabetes, dehydration, possibly due to 

hypotension, ACE inhibitor, NSAIDS.


Treat with hydration. Hold blood pressure medications.


Follow blood sugars, electrolytes.


Hold  nonsteroidals that she has been taking for back pain





Uncontrolled diabetes


blood sugars are up to 4-500s


Given the patients creatinine elevation she is not a good candidate for 

metformin


we'll add a dose of NPH now


Increase Levemir and continue mealtime Humalog


Monitor blood sugars and adjust as needed





Hyponatremia


This is pseudohyponatremia related to high blood sugars


Control diabetes


Hydrate with IV fluids





Abnormal urine analysis


I think this is a contaminated sample


Hold antibiotics





Chronic back pain with exacerbation after falling


CT chest abd pelvis showed no fx


Try to avoid nonsteroidals due to renal failure


Try to avoid narcotics due to seizure disorder


use Tylenol, Lidoderm,  flexeril, nonpharmaceutical,  methods.


add lorcet





DVT prophylaxis will be with subcutaneous heparin

## 2018-03-10 NOTE — EDM.PDOC
Scribed by Zulay Ochoa 03/10/18 5614 for Gumaro Kline MD





ED HPI GENERAL MEDICAL PROBLEM





- General


Chief Complaint: Back Pain or Injury


Stated Complaint: BACK PAIN [id number]


Time Seen by Provider: 03/10/18 16:41


Source of Information: Reports: Patient, RN, RN Notes Reviewed


History Limitations: Reports: No Limitations





- History of Present Illness


INITIAL COMMENTS - FREE TEXT/NARRATIVE: 


Patient presents to the ER with complaint of low back pain sustained from a 

fall on 18. Patient was admitted and just discharged today. She was not 

happy with the discharge treatments of heat, ice, Tylenol and Ibuprofen as 

needed and requests stronger pain medication. Patient had CT imaging on the day 

of her fall with no acute fractures or findings as per radiologist report. 

Denies any other injuries.


Severity: Severe





- Related Data


 Allergies











Allergy/AdvReac Type Severity Reaction Status Date / Time


 


No Known Allergies Allergy   Verified 18 16:23











Home Meds: 


 Home Meds





Albuterol [Proair HFA] 2 puff INH ASDIRECTED PRN 14 [History]


Fluticasone Propionate [Flovent  MCG] 2 puff INH ASDIRECTED PRN 14 

[History]


sitaGLIPtin Phos/Metformin HCl [Janumet 50-1,000 MG] 1,000 mg PO DAILY 14 

[History]


Insulin Aspart [Novolog Flexpen] 30 units SQ TID PRN 16 [History]


Insulin Glarg,Human.Rec.Analog [Lantus] 30 units SQ BID 16 [History]


Gabapentin 600 mg PO TID 16 [History]


Aspirin [Ecotrin] 81 mg PO DAILY 17 [History]


Clopidogrel Bisulfate [Clopidogrel] 1 tab PO DAILY 18 [History]


Rosuvastatin Calcium 1 tab PO BEDTIME 18 [History]


levETIRAcetam [Keppra] 1 tab PO BID 18 [History]


Acetaminophen [Tylenol] 650 mg PO Q4H PRN  tablet 03/10/18 [Rx]


Cyclobenzaprine [Flexeril] 10 mg PO TID PRN  tablet 03/10/18 [Rx]


Gabapentin [Neurontin] 300 mg PO TID #21 capsule 03/10/18 [Rx]


Insulin Aspart [NovoLOG] 0 unit SUBCUT TIDAC  pen 03/10/18 [Rx]


Zolpidem [Ambien] 5 mg PO BEDTIME PRN #7 tablet 03/10/18 [Rx]











Past Medical History


HEENT History: Reports: None


Cardiovascular History: Reports: Hypertension, Other (See Below)


Other Cardiovascular History: hx rhuematic fever


Respiratory History: Reports: Asthma


Gastrointestinal History: Reports: None


Genitourinary History: Reports: None


OB/GYN History: Reports: Pregnancy


Musculoskeletal History: Reports: Back Pain, Chronic, Osteoarthritis


Neurological History: Reports: Neuropathy, Diabetic, Neuropathy, Peripheral, 

Seizure, TIA


Other Neuro History: mini strokes


Psychiatric History: Reports: None


Endocrine/Metabolic History: Reports: Diabetes, Type II, IDDM, Obesity/BMI 30+


Hematologic History: Reports: None


Immunologic History: Reports: None


Oncologic (Cancer) History: Reports: None


Dermatologic History: Reports: None





- Infectious Disease History


Infectious Disease History: Reports: Chicken Pox





- Past Surgical History


Head Surgeries/Procedures: Reports: None


Female  Surgical History: Reports:  Section





Social & Family History





- Family History


Family Medical History: Noncontributory


Respiratory: Reports: Asthma


Other Respiratory Family Hisory: Brother


Endocrine/Metabolic: Reports: Diabetes, type II


Other Endocrine/Metabolic Family History: Brother





- Tobacco Use


Smoking Status *Q: Current Every Day Smoker


Years of Tobacco use: 20


Packs/Tins Daily: 0.1


Used Tobacco, but Quit: No


Month Tobacco Last Used: 02


Second Hand Smoke Exposure: Yes





- Caffeine Use


Caffeine Use: Reports: Soda





- Alcohol Use


Days Per Week of Alcohol Use: 0





- Recreational Drug Use


Recreational Drug Use: No





- Sexual History


Sexual History: Reports: Sexually Active, Single Partner





- Living Situation & Occupation


Living situation: Reports: with Significant Other, with Family


Occupation: Unemployed





ED ROS GENERAL





- Review of Systems


Review Of Systems: ROS reveals no pertinent complaints other than HPI.





ED EXAM,LOWER BACK PAIN/INJURY





- Physical Exam


Exam: See Below


Exam Limited By: No Limitations


General Appearance: Alert, No Apparent Distress, Obese


Head: Atraumatic, Normocephalic


Neck: Normal Inspection, Supple, Non-Tender, Full Range of Motion


Respiratory/Chest: No Respiratory Distress, Lungs Clear, No Accessory Muscle Use

, Decreased Breath Sounds


Cardiovascular: Regular Rate, Rhythm


Back Exam: Decreased Range of Motion, Paraspinal Tenderness (lumbar region).  No

: CVA Tenderness (L), CVA Tenderness (R)


Extremities: Normal Inspection, Normal Range of Motion, Non-Tender, No Pedal 

Edema, Normal Capillary Refill


Neurological: Alert, No Motor/Sensory Deficits


Psychiatric: Depressed Mood, Flat Affect


Skin Exam: Warm, Dry, Intact, Normal Color, No Rash





Course





- Orders/Labs/Meds


Meds: 


Medications














Discontinued Medications














Generic Name Dose Route Start Last Admin





  Trade Name Genaro  PRN Reason Stop Dose Admin


 


Hydrocodone Bitart/Acetaminophen  1 tab  03/10/18 16:45  





  Norco 325-10 Mg  PO  03/10/18 16:46  





  ONETIME ONE   


 


Ketorolac Tromethamine  30 mg  03/10/18 16:43  





  Toradol  IM  03/10/18 16:44  





  ONETIME ONE   


 


Lidocaine HCl  15 gm  03/10/18 16:42  





  Lidocaine 5%  TOP  03/10/18 16:43  





  ONETIME ONE   














- Re-Assessments/Exams


Free Text/Narrative Re-Assessment/Exam: 





03/10/18 16:49


Reviewed the ER course and imaging from 18 and the patient's hospital 

course. I prescribed the patient Lidocaine. ointment 5%. I have declined to 

prescribe any narcotic pain medication as she has no fractures or injuries 

significant enough to warrant opiates. I have advised her to follow up in 

clinic this week with her primary doctor to discuss pain management.





Departure





- Departure


Time of Disposition: 16:43


Disposition: Home, Self-Care 01


Condition: Fair


Clinical Impression: 


Acute low back pain


Qualifiers:


 Back pain laterality: unspecified Sciatica presence: without sciatica 

Qualified Code(s): M54.5 - Low back pain








- Discharge Information


Instructions:  Back Pain, Adult, Easy-to-Read


Forms:  ED Department Discharge


Additional Instructions: 


RX: Lidocaine ointment 5%. 


Follow up in clinic with your doctor on Monday or Tuesday,  or , for 

recheck of back pain.


Continue other treatments as per Dr. Carson's discharge instructions. 





I have read and agree with the documentation that has been completed regarding 

this visit. By signing this record, I attest that the documentation was 

completed in my physical presence and is an accurate record of the encounter.

## 2018-03-10 NOTE — PCM.DCSUM1
**Discharge Summary





- Hospital Course


Free Text/Narrative:: 





The patient presented after a fall.





Fall


It is unclear if the patient had a syncopal episode after the fall. She denies 

any symptoms prior to the fall. She was going up on the stairs and fell 

backwards hitting the head.





CT of the head showed no intracranial bleed. 


He described subtle, new microvascular ischemic infarct, right parietal lobe  

new compared to 1/6/18


The patient has a history of prior stroke.


The patient currently has normal neurologic deficit. I doubt that the patient 

had an acute stroke that caused the falling.


Well continue on Plavix, statin, aspirin





History of seizure disorder


No apparent seizure with this episode. Continue Keppra





Hypotension noted in the emergency room.


The patient has a history of hypertension. Was on lisinopril and Norvasc, hctz. 

I will hold these medications.


Hypotension has resolved








Acute renal failure


The patients admission creatinine is 2.2


From Epic system the patients last creatinine last September was around 1.


Resolved since admission with IV hydration


This might relate to uncontrolled diabetes, dehydration, possibly due to 

hypotension, ACE inhibitor, NSAIDS.


Treated with hydration. Hold blood pressure medications.


d/w pt to minimize  nonsteroidals that she has been taking for back pain


She will follow-up with her primary care physician in a few days to recheck 

electrolyte panel, blood pressure, renal function





Uncontrolled diabetes


blood sugars were up to 4-500s


During the acute renal failure we were holding metformin


Noted to acute renal failure resolved the patient will go back to her home 

regimen.


According to the patient blood sugars are in the  range at home


She will follow-up with her primary care physician in a few days with a blood 

sugar diary to further adjust insulin regimen





Hyponatremia


This is pseudohyponatremia related to high blood sugars


Control diabetes


Hydrated with IV fluids





Abnormal urine analysis


I think this is a contaminated sample


Hold antibiotics





Chronic back pain with exacerbation after falling


CT chest abd pelvis showed no fx


Try to minimize nonsteroidals due to renal failure


Try to avoid narcotics due to seizure disorder


use Tylenol,  flexeril, nonpharmaceutical methods.











- Discharge Data


Discharge Date: 03/10/18


Discharge Disposition: Home, Self-Care 01


Condition: Good





- Discharge Diagnosis/Problem(s)


(1) Hypotension


SNOMED Code(s): 84012774


   ICD Code: I95.9 - HYPOTENSION, UNSPECIFIED   Status: Acute   Current Visit: 

Yes   





(2) Acute renal failure


SNOMED Code(s): 79555298


   ICD Code: N17.9 - ACUTE KIDNEY FAILURE, UNSPECIFIED   Status: Acute   

Current Visit: Yes   





(3) Diabetes mellitus type 2, uncontrolled


SNOMED Code(s): 22733178


   ICD Code: E11.65 - TYPE 2 DIABETES MELLITUS WITH HYPERGLYCEMIA   Status: 

Acute   Current Visit: Yes   


Qualifiers: 


   Diabetes mellitus complication status: with hyperglycemia   Diabetes 

mellitus long term insulin use: with long term use   Qualified Code(s): E11.65 

- Type 2 diabetes mellitus with hyperglycemia; Z79.4 - Long term (current) use 

of insulin; Z79.4 - Long term (current) use of insulin; Z79.4 - Long term (

current) use of insulin; Z79.4 - Long term (current) use of insulin   





(4) Fall at home


SNOMED Code(s): 32716930


   ICD Code: W19.XXXA - UNSPECIFIED FALL, INITIAL ENCOUNTER; Y92.009 - UNSP 

PLACE IN UNSP NON-INSTITUT (PRIVATE) RESIDENCE AS PLACE   Status: Acute   

Current Visit: Yes   


Qualifiers: 


   Encounter type: initial encounter   Qualified Code(s): W19.XXXA - 

Unspecified fall, initial encounter; Y92.009 - Unspecified place in unspecified 

non-institutional (private) residence as the place of occurrence of the 

external cause; Y92.009 - Unspecified place in unspecified non-institutional (

private) residence as the place of occurrence of the external cause   





- Patient Instructions


Diet: Heart Healthy Diet


Activity: As Tolerated





- Discharge Plan


Home Medications: 


 Home Meds





Albuterol [Proair HFA] 2 puff INH ASDIRECTED PRN 07/14/14 [History]


Fluticasone Propionate [Flovent  MCG] 2 puff INH ASDIRECTED PRN 07/14/14 

[History]


sitaGLIPtin Phos/Metformin HCl [Janumet 50-1,000 MG] 1,000 mg PO DAILY 07/14/14 

[History]


Insulin Aspart [Novolog Flexpen] 30 units SQ TID PRN 02/07/16 [History]


Insulin Glarg,Human.Rec.Analog [Lantus] 30 units SQ BID 02/07/16 [History]


Gabapentin 600 mg PO TID 07/30/16 [History]


Aspirin [Ecotrin] 81 mg PO DAILY 08/24/17 [History]


Clopidogrel Bisulfate [Clopidogrel] 1 tab PO DAILY 01/06/18 [History]


Rosuvastatin Calcium 1 tab PO BEDTIME 01/06/18 [History]


levETIRAcetam [Keppra] 1 tab PO BID 01/06/18 [History]


Acetaminophen [Tylenol] 650 mg PO Q4H PRN  tablet 03/10/18 [Rx]


Cyclobenzaprine [Flexeril] 10 mg PO TID PRN  tablet 03/10/18 [Rx]


Insulin Aspart [NovoLOG] 0 unit SUBCUT TIDAC  pen 03/10/18 [Rx]








Referrals: 


Lele Maciel, NP [Primary Care Provider] -  (in 3-4 days)





- Discharge Summary/Plan Comment


DC Time >30 min.: No





- General Info


Date of Service: 03/10/18


Admission Dx/Problem (Free Text: 


 Admission Diagnosis/Problem





Admission Diagnosis/Problem      Fall








Subjective Update: 





blood sugars have been better but still significantly elevated.


Complaining of some moderate back pain, worse with movements, since the falling.


Somewhat better with heat pad and Tylenol but not Completely relieved.


No nausea or vomiting.


Functional Status: Reports: Tolerating Diet





- Review of Systems


General: Denies: Fever


Pulmonary: Denies: Shortness of Breath


Cardiovascular: Denies: Chest Pain


Neurological: Denies: Confusion





- Patient Data


Vitals - Most Recent: 


 Last Vital Signs











Temp  36.8 C   03/10/18 08:17


 


Pulse  85   03/10/18 08:17


 


Resp  20   03/10/18 08:17


 


BP  139/82   03/10/18 08:17


 


Pulse Ox  97   03/10/18 08:17











Weight - Most Recent: 109.951 kg


I&O - Last 24 hours: 


 Intake & Output











 03/09/18 03/10/18 03/10/18





 22:59 06:59 14:59


 


Intake Total 440 1023 1000


 


Balance 440 1023 1000











Lab Results - Last 24 hrs: 


 Laboratory Results - last 24 hr











  03/09/18 03/09/18 03/10/18 Range/Units





  16:45 21:17 07:36 


 


WBC     (5.0-10.0)  10^3/uL


 


RBC     (4.2-5.4)  10^6/uL


 


Hgb     (12.0-16.0)  g/dL


 


Hct     (37.0-47.0)  %


 


MCV     ()  fL


 


MCH     (27.0-34.0)  pg


 


MCHC     (33.0-35.0)  g/dL


 


Plt Count     (150-450)  10^3/uL


 


Neut % (Auto)     (42.2-75.2)  %


 


Lymph % (Auto)     (20.5-50.1)  %


 


Mono % (Auto)     (2-8)  %


 


Eos % (Auto)     (1.0-3.0)  %


 


Baso % (Auto)     (0.0-1.0)  %


 


Sodium     (135-145)  mmol/L


 


Potassium     (3.6-5.0)  mmol/L


 


Chloride     (101-111)  mmol/L


 


Carbon Dioxide     (21.0-31.0)  mmol/L


 


Anion Gap     


 


BUN     (7-18)  mg/dL


 


Creatinine     (0.6-1.3)  mg/dL


 


Est Cr Clr Drug Dosing     mL/min


 


Estimated GFR (MDRD)     


 


Glucose     ()  mg/dL


 


POC Glucose  242 H  266 H  195 H  ()  mg/dl


 


Calcium     (8.4-10.2)  mg/dl














  03/10/18 03/10/18 Range/Units





  09:45 09:45 


 


WBC  9.2   (5.0-10.0)  10^3/uL


 


RBC  3.87 L   (4.2-5.4)  10^6/uL


 


Hgb  11.1 L   (12.0-16.0)  g/dL


 


Hct  32.7 L   (37.0-47.0)  %


 


MCV  84.5   ()  fL


 


MCH  28.7   (27.0-34.0)  pg


 


MCHC  33.9   (33.0-35.0)  g/dL


 


Plt Count  240   (150-450)  10^3/uL


 


Neut % (Auto)  66.2   (42.2-75.2)  %


 


Lymph % (Auto)  20.3 L   (20.5-50.1)  %


 


Mono % (Auto)  5.1   (2-8)  %


 


Eos % (Auto)  8.0 H   (1.0-3.0)  %


 


Baso % (Auto)  0.4   (0.0-1.0)  %


 


Sodium   133 L  (135-145)  mmol/L


 


Potassium   4.5  (3.6-5.0)  mmol/L


 


Chloride   106  (101-111)  mmol/L


 


Carbon Dioxide   21.0  (21.0-31.0)  mmol/L


 


Anion Gap   10.5  


 


BUN   16  (7-18)  mg/dL


 


Creatinine   0.8  (0.6-1.3)  mg/dL


 


Est Cr Clr Drug Dosing   90.53  mL/min


 


Estimated GFR (MDRD)   > 60  


 


Glucose   308 H  ()  mg/dL


 


POC Glucose    ()  mg/dl


 


Calcium   8.3 L  (8.4-10.2)  mg/dl











Med Orders - Current: 


 Current Medications





Acetaminophen (Tylenol)  650 mg PO Q4H PRN


   PRN Reason: Pain (Mild 1-3)/fever


   Last Admin: 03/09/18 20:01 Dose:  650 mg


Hydrocodone Bitart/Acetaminophen (Norco 325-5 Mg)  1 tab PO Q4H PRN


   PRN Reason: severe pain


   Last Admin: 03/10/18 10:01 Dose:  1 tab


Albuterol (Proventil Hfa)  0 gm INH Q4H PRN


   PRN Reason: Shortness of Breath


Aspirin (Halfprin)  81 mg PO DAILY [facility]


   Last Admin: 03/10/18 09:00 Dose:  81 mg


Clopidogrel Bisulfate (Plavix)  75 mg PO DAILY [facility]


   Last Admin: 03/10/18 09:01 Dose:  75 mg


Cyclobenzaprine HCl (Flexeril)  10 mg PO TID PRN


   PRN Reason: pain - moderate


Gabapentin (Neurontin)  300 mg PO TID [facility]


   Last Admin: 03/10/18 09:01 Dose:  300 mg


Heparin Sodium (Porcine) (Heparin Sodium)  5,000 units SUBCUT Q8HR [facility]


   Last Admin: 03/10/18 05:43 Dose:  5,000 units


Sodium Chloride (Normal Saline)  1,000 mls @ 125 mls/hr IV ASDIRECTED [facility]


   Last Admin: 03/10/18 07:21 Dose:  125 mls/hr


Insulin Aspart (Novolog)  0 unit SUBCUT TIDAC [facility]


   PRN Reason: Protocol


   Last Admin: 03/10/18 08:52 Dose:  2 units


Insulin Aspart (Novolog)  10 unit SUBCUT TID [facility]


   Last Admin: 03/10/18 09:07 Dose:  10 units


Insulin Detemir (Levemir)  40 unit SUBCUT BID [facility]


   Last Admin: 03/10/18 08:58 Dose:  40 units


Levetiracetam (Keppra)  500 mg PO BID [facility]


   Last Admin: 03/10/18 09:01 Dose:  500 mg


Lidocaine (Lidoderm 5%)  700 mg TOP Q24H [facility]


   Last Admin: 03/09/18 21:15 Dose:  Not Given


Miscellaneous Information (Remove Patch)  0 ea TRDERM DAILY@0800 [facility]


   Last Admin: 03/10/18 10:06 Dose:  Not Given


Ondansetron HCl (Zofran)  4 mg IVPUSH Q4H PRN


   PRN Reason: Nausea/Vomiting


Rosuvastatin Calcium (Crestor)  20 mg PO BEDTIME [facility]


   Last Admin: 03/09/18 21:15 Dose:  20 mg


Sodium Chloride (Saline Flush)  10 ml FLUSH ASDIRECTED PRN


   PRN Reason: Keep Vein Open


   Last Admin: 03/08/18 16:22 Dose:  10 ml


Zolpidem Tartrate (Ambien)  5 mg PO BEDTIME PRN


   PRN Reason: Sleep


   Last Admin: 03/09/18 21:14 Dose:  5 mg





Discontinued Medications





Albuterol (Proventil Hfa)  0 gm INH ASDIRECTED PRN


   PRN Reason: Dyspnea


Gabapentin (Neurontin)  600 mg PO TID [facility]


   Last Admin: 03/08/18 23:19 Dose:  Not Given


Gabapentin (Neurontin)  100 mg PO ONETIME ONE


   Stop: 03/08/18 22:24


   Last Admin: 03/08/18 22:43 Dose:  100 mg


Sodium Chloride (Normal Saline)  1,000 mls @ 999 mls/hr IV .BOLUS ONE


   Stop: 03/08/18 17:25


   Last Admin: 03/08/18 16:10 Dose:  999 mls/hr


Sodium Chloride (Normal Saline)  1,000 mls @ 999 mls/hr IV .BOLUS ONE


   Stop: 03/08/18 18:46


   Last Admin: 03/08/18 18:11 Dose:  999 mls/hr


Insulin Aspart (Novolog)  10 unit SUBCUT ONETIME ONE


   Stop: 03/08/18 21:19


   Last Admin: 03/08/18 22:38 Dose:  10 units


Insulin Aspart (Novolog)  20 unit SUBCUT ONETIME ONE


   Stop: 03/09/18 07:43


   Last Admin: 03/09/18 08:10 Dose:  20 units


Insulin Detemir (Levemir)  30 unit SUBCUT BID [facility]


   Last Admin: 03/09/18 09:10 Dose:  30 units


Insulin Detemir (Levemir)  40 unit SUBCUT BID [facility]


Insulin Human NPH (Novolin N)  10 unit SUBCUT ONETIME ONE


   Stop: 03/09/18 10:52


   Last Admin: 03/09/18 11:28 Dose:  10 units


Iopamidol (Isovue-300 (61%))  100 ml IVPUSH ONETIME ONE


   Stop: 03/08/18 16:27


   Last Admin: 03/08/18 16:30 Dose:  100 ml











- Exam


General: Reports: Alert, Oriented


Lungs: Reports: Clear to Auscultation, Normal Respiratory Effort


GI/Abdominal Exam: Normal Bowel Sounds, Soft, Non-Tender


Back Exam: Reports: Normal Inspection


Extremities: No Pedal Edema


Neurological: Reports: No New Focal Deficit


Psy/Mental Status: Reports: Alert, Normal Affect, Normal Mood





*Q Meaningful Use (DIS)





- VTE *Q


VTE Criteria *Q: 








- Stroke *Q


Stroke Criteria *Q: 








- AMI *Q


AMI Criteria *Q:

## 2018-03-14 NOTE — EKG
03/08/2018- PAGE, DIAMOND SAUCEDO -

 

EKG per my reading shows sinus rhythm at a rate of 100.

 

DD:  03/13/2018 19:57:53

DT:  03/13/2018 21:37:55

[facility]

Job #:  [id number]

## 2018-04-24 NOTE — EDM.PDOC
ED HPI GENERAL MEDICAL PROBLEM





- General


Chief Complaint: Respiratory Problem


Stated Complaint: [id number] RESP TROUBLE


Time Seen by Provider: 18 00:30


Source of Information: Reports: Patient, Family


History Limitations: Reports: No Limitations





- History of Present Illness


INITIAL COMMENTS - FREE TEXT/NARRATIVE: 





c/o cough for 4-5 days. Last neb 2 days ago. States does have setup and solution

, just has not used. Unsure if fever, has been sweaty. 





- Related Data


 Allergies











Allergy/AdvReac Type Severity Reaction Status Date / Time


 


No Known Allergies Allergy   Verified 18 19:58











Home Meds: 


 Home Meds





Albuterol [Proair HFA] 2 puff INH ASDIRECTED PRN 14 [History]


Fluticasone Propionate [Flovent  MCG] 2 puff INH ASDIRECTED PRN 14 

[History]


sitaGLIPtin Phos/Metformin HCl [Janumet 50-1,000 MG] 1,000 mg PO DAILY 14 

[History]


Insulin Aspart [Novolog Flexpen] 30 units SQ TID PRN 16 [History]


Insulin Glarg,Human.Rec.Analog [Lantus] 30 units SQ BID 16 [History]


Gabapentin 600 mg PO TID 16 [History]


Aspirin [Ecotrin] 81 mg PO DAILY 17 [History]


Clopidogrel Bisulfate [Clopidogrel] 1 tab PO DAILY 18 [History]


Rosuvastatin Calcium 1 tab PO BEDTIME 18 [History]


levETIRAcetam [Keppra] 1 tab PO BID 18 [History]


Acetaminophen [Tylenol] 650 mg PO Q4H PRN  tablet 03/10/18 [Rx]


Cyclobenzaprine [Flexeril] 10 mg PO TID PRN  tablet 03/10/18 [Rx]


Gabapentin [Neurontin] 300 mg PO TID #21 capsule 03/10/18 [Rx]


Insulin Aspart [NovoLOG] 0 unit SUBCUT TIDAC  pen 03/10/18 [Rx]


Zolpidem [Ambien] 5 mg PO BEDTIME PRN #7 tablet 03/10/18 [Rx]











Past Medical History


HEENT History: Reports: None


Cardiovascular History: Reports: Hypertension, Other (See Below)


Other Cardiovascular History: hx rhuematic fever


Respiratory History: Reports: Asthma


Gastrointestinal History: Reports: None


Genitourinary History: Reports: None


OB/GYN History: Reports: Pregnancy


Musculoskeletal History: Reports: Back Pain, Chronic, Osteoarthritis


Neurological History: Reports: Neuropathy, Diabetic, Neuropathy, Peripheral, 

Seizure, TIA


Other Neuro History: mini strokes


Psychiatric History: Reports: None


Endocrine/Metabolic History: Reports: Diabetes, Type II, IDDM, Obesity/BMI 30+


Hematologic History: Reports: None


Immunologic History: Reports: None


Oncologic (Cancer) History: Reports: None


Dermatologic History: Reports: None





- Infectious Disease History


Infectious Disease History: Reports: Chicken Pox





- Past Surgical History


Head Surgeries/Procedures: Reports: None


Female  Surgical History: Reports:  Section





Social & Family History





- Family History


Family Medical History: Noncontributory


Respiratory: Reports: Asthma


Other Respiratory Family Hisory: Brother


Endocrine/Metabolic: Reports: Diabetes, type II


Other Endocrine/Metabolic Family History: Brother





- Tobacco Use


Smoking Status *Q: Current Every Day Smoker


Years of Tobacco use: 20


Packs/Tins Daily: 0.3


Used Tobacco, but Quit: No


Month/Year Tobacco Last Used: 02


Second Hand Smoke Exposure: Yes





- Caffeine Use


Caffeine Use: Reports: Soda





- Alcohol Use


Days Per Week of Alcohol Use: 0





- Recreational Drug Use


Recreational Drug Use: No





- Sexual History


Sexual History: Reports: Sexually Active, Single Partner





- Living Situation & Occupation


Living situation: Reports: with Significant Other, with Family


Occupation: Unemployed





ED ROS GENERAL





- Review of Systems


Review Of Systems: See Below


Constitutional: Reports: Night Sweats


HEENT: Reports: No Symptoms


Respiratory: Reports: Wheezing, Cough.  Denies: Sputum


Cardiovascular: Reports: No Symptoms


GI/Abdominal: Reports: No Symptoms


Skin: Reports: No Symptoms


Neurological: Reports: No Symptoms





ED EXAM, GENERAL





- Physical Exam


Exam: See Below


Exam Limited By: No Limitations


General Appearance: Obese, Other (drowsy arouses easily, unkempt )


Ears: Normal External Exam, Normal TMs


Nose: Normal Inspection


Throat/Mouth: Normal Inspection


Head: Atraumatic, Normocephalic


Neck: Normal Inspection


Respiratory/Chest: No Respiratory Distress, Rhonchi (bronchial clears with cough

), Wheezing


Cardiovascular: Normal Peripheral Pulses, Regular Rate, Rhythm, Tachycardia


GI/Abdominal: Normal Bowel Sounds


Neurological: Oriented, Normal Cognition


Psychiatric: Flat Affect


Skin Exam: Warm, Dry, Intact





Course





- Vital Signs


Last Recorded V/S: 


 Last Vital Signs











Temp  98.2 F   18 01:37


 


Pulse  102 H  18 01:37


 


Resp  18   18 01:37


 


BP  97/78   18 01:37


 


Pulse Ox  93 L  18 01:37














- Orders/Labs/Meds


Orders: 


 Active Orders 24 hr











 Category Date Time Status


 


 RT Aerosol Therapy [RC] ASDIRECTED Care  18 00:39 Active


 


 CXR [Chest 1V Frontal] [CR] Urgent Exams  18 23:06 Taken











Meds: 


Medications














Discontinued Medications














Generic Name Dose Route Start Last Admin





  Trade Name Freq  PRN Reason Stop Dose Admin


 


Albuterol  2.5 mg  18 00:39  18 00:47





  Proventil Neb Soln  NEB  18 00:40  2.5 mg





  ONETIME ONE   Administration





     





     





     





     


 


Azithromycin  500 mg  18 00:51  18 01:36





  Zithromax  PO  18 00:52  500 mg





  ONETIME ONE   Administration





     





     





     





     














- Radiology Interpretation


Free Text/Narrative:: 





CXR: Bilateral bronchitis and basilar atelectasis





Departure





- Departure


Time of Disposition: 01:43


Disposition: Home, Self-Care 01


Condition: Good


Clinical Impression: 


 Bronchitis





Exacerbation of asthma


Qualifiers:


 Asthma severity: moderate Asthma persistence: persistent Qualified Code(s): 

J45.41 - Moderate persistent asthma with (acute) exacerbation








- Discharge Information


Instructions:  Upper Respiratory Infection, Adult, Easy-to-Read


Referrals: 


Lele Maciel NP [Ordering Only Provider] - 


Forms:  ED Department Discharge


Additional Instructions: 


Albuterol Nebulizer every 4 hours 


clinic follow up end of week if not improving


increase fluids


tylenol 650mg every 4 hours as needed for fever/discomfort


Azithromycin 250mg daily for 4 days








- My Orders


Last 24 Hours: 


My Active Orders





18 23:06


CXR [Chest 1V Frontal] [CR] Urgent 





18 00:39


RT Aerosol Therapy [RC] ASDIRECTED 














- Assessment/Plan


Last 24 Hours: 


My Active Orders





18 23:06


CXR [Chest 1V Frontal] [CR] Urgent 





18 00:39


RT Aerosol Therapy [RC] ASDIRECTED

## 2018-05-11 NOTE — HP
DATE OF SERVICE:  2018

 

CHIEF COMPLAINT:  Dizziness.

 

HISTORY OF PRESENTING ILLNESS:  Mrs. Anali Cardenas is a 42-year-old female with

medical history significant for hypertension, hyperlipidemia, type 2 diabetes

mellitus, diabetes complicated with neuropathy, morbid obesity, history of

seizure disorder during pregnancy in the past, history of chronic opioid use and

opioid dependency,  history of anxiety, depression, history of asthma, presented

to the ER today  with having a fall and feeling dizzy.  While in the ER, the

patient was noted to have low blood pressure with blood pressure down to 97/55,

and also uncontrolled diabetes needing admission to the hospital.  She had a

blood sugar around 400 while in the emergency room.

 

At this time, the patient claims that she has been feeling sick for the last 1

to 2 days where she complains of increasing weakness and tiredness.  She grades

the weakness as 6/10 in intensity, which got aggravated on ambulation, relieved

with rest.  Complains of having dizzy spells earlier and falling down on her

knees.  She denies any loss of consciousness.  She denies any chest pain.  No

shortness of breath.  No abdominal pain.  No nausea.  No vomiting.  No diarrhea

in the last few days.  No fevers.  No chills.  No cough.  No sputum in the last

few days.

 

The patient denied any history of chest pains on exertion.  No history of

dyspnea on exertion.  No history of orthopnea or paroxysmal nocturnal dyspnea.

The patient denied any history of hematemesis, hematochezia, or melenic stools.

Normal bowel and bladder habits otherwise.

 

REVIEW OF SYSTEMS:

A complete review of system including skin, ear, nose, and throat,

cardiovascular system, respiratory system, gastrointestinal system,

genitourinary system, hematology, oncology, neurology, allergy, immunology were

all evaluated and were negative except for the above-said notes.

 

PAST MEDICAL HISTORY:  Significant for:

1. Hypertension.

2. Hyperlipidemia.

3. Type 2 diabetes mellitus.

4. Morbid obesity.

5. Chronic opioid use.

6. History of anxiety, depression.

7. History of seizures in the past.

8. Possible TIAs in the past.

 

PAST SURGICAL HISTORY:  Significant for:

1. Vaginal uterus dilatation and curettage.

2. Laparotomy with .

3. Laparoscopic tubal ligation.

 

FAMILY HISTORY:  Significant for heart attack, diabetes, and kidney disease with

hypertension in her mother, and heart attack with COPD and cerebrovascular

accident in her father.

 

SOCIAL HISTORY:  The patient continues to smoke.  She smokes around 5 to 6

cigarettes a day.  No alcohol use.  No substance abuse.

 

ALLERGIES:  No known drug allergies.

 

 

 

HOME MEDICATIONS:  Include:

1. Janumet 1000 mg daily.

2. Keppra 1 tablet twice a day.

3. Ambien 5 mg at bedtime as needed.

4. Rosuvastatin 1 tablet at bedtime.

5. NovoLog 30 units 3 times a day.

6. Levemir 45 units at bedtime.

7. Neurontin 300 mg 3 times a day.

8. Flovent 2 puffs inhalation as needed.

9. Flexeril 10 mg 3 times a day as needed.

10.Plavix 1 tablet daily.

11.Aspirin 81 mg daily.

12.Albuterol 2 puffs inhalation as needed.

13.Tylenol 650 every 4 hours as needed for pain.

 

PHYSICAL EXAMINATION:

Vital Signs:  On the day of discharge vitals; temperature of 97.3, pulse of 100,

blood pressure of 100/42, respiratory rate of 14, saturating at 100% on room

air.

General Appearance:  The patient is well oriented to time, place, and person.

Follows commands spontaneously.

Cardiovascular System:  S1, S2 heard with normal intensity.  No gallops.

Respiratory System:  Clear to auscultation bilaterally.  No wheeze.  No

crepitations.

Abdomen:  Soft.  Bowel sounds positive.  Nontender.  No rigidity.

Extremities:  No edema in bilateral lower extremities.

Neurology:  No gross focal neurological deficits.

 

LABORATORY DATA:  Reviewed:  WBC 13.4, hemoglobin 12.2, hematocrit 35.6,

platelet count 317.

 

Sodium 127, potassium 4.1, chloride 100, bicarb 21, BUN 39, creatinine 1.4,

glucose 375.  Lactic acid 1.7, AST 14, ALT 10, alkaline phos is 127.

 

Urinalysis; small leukocyte esterase, negative for nitrates, many bacteria, many

epithelial cells.

 

ASSESSMENT:

1. Type 2 diabetes mellitus, uncontrolled with severe hyperglycemia.

2. Hyponatremia.

3. Acute renal failure.

4. Possible urinary tract infection with leukocytosis.

5. Hypotension.

6. Morbid obesity.

7. History of opioid use.

8. History of seizures.

 

PLAN:

1. Type 2 diabetes mellitus, uncontrolled.  The patient was noted to have

    severe hyperglycemia.  The patient usually takes insulin regimen at home.

    We will resume the insulin.  We will check her fingersticks with each

    meals.  Have her on supplemental scale insulin as needed for additional

    coverage of her blood glucose.

2. Acute renal failure.  The patient creatinine up to 1.4.  Her baseline

    creatinine is around 0.8-1.1.  We will keep her hydrated with IV fluids.

    Avoid nephrotoxic agents.  Dose adjust medications for renal function.

3. Hypertension.  The patient is currently hypotensive, hold antihypertensive

    medications, keep her hydrated with IV fluids.

4. Urinary tract infection.  The patient is noted to have muddy urine.  We

    will obtain blood cultures, urine cultures, start her on IV antibiotic,

    ceftriaxone, and titrate the antibiotics once we have the culture reports

    available.

5. History of asthma.  The patient is currently on inhalation treatment.

    Continue the same.

6. Hyperlipidemia.  Continue rosuvastatin.

7. The patient claims that she fell, but her knee joint survey in normal, mild

    tenderness elicited.  No swelling noted.  Ankle joints, no swelling noted.

    No erythema noted.

8. DVT prophylaxis.  We will have her on heparin for DVT prophylaxis.

9. Code status.  The patient wants to be full code.

10.Discussed with the patient regarding the plan of care.  Discussed with

    Angelica from ER regarding the plan of care.  Reviewed the labs and

    medications.  Reviewed the old charts.

 

DD:  2018 01:19:39

DT:  2018 02:16:05

[facility]

Job #:  [id number]

## 2018-05-11 NOTE — PN
DATE:  05/11/2018

 

SUBJECTIVE:  Ms. Theresa Briones is a 42-year-old female with medical history

significant for hypertension, hyperlipidemia, type 2 diabetes mellitus, obesity,

was admitted to the hospital after she had uncontrolled diabetes, dehydration,

possible urinary tract infection, and had a fall at her home resulting in right

ankle sprain.

 

For the last 24 hours, the patient continues to have pain to the right ankle

site.  She grades the pain as 6/10 in intensity, aggravated on movement and

ambulation, relieved with pain medication, nonradiating type of pain, not

associated with nausea or vomiting.  Denies any chest pain.  No shortness of

breath.  No abdominal pain.  No diarrhea.

 

REVIEW OF SYSTEMS:

Cardiovascular, respiratory, gastrointestinal, neurology, constitutional were

all evaluated.

 

PHYSICAL EXAMINATION:

Vital Signs:  Temperature of 98.5, pulse of 88, blood pressure of 117/67,

respiratory rate of 14, and saturating at 99% on room air.

General Appearance:  The patient is well oriented to time, place, and person.

Follows commands spontaneously.

Cardiovascular System:  S1, S2 heard with normal intensity.  No gallops.

Respiratory System:  Clear to auscultation bilaterally.  No wheeze.  No

crepitations.

Abdomen:  Soft.  Bowel sounds positive.  Nontender.  No rigidity.

Extremities:  No edema in the bilateral lower extremities.  Mild swelling noted

around the right ankle.  No signs of fracture noted at this time.

Neurology:  No gross focal neurological deficit.

 

MEDICATIONS:  Medications reviewed.  Continue with:

1. Tylenol 650 every 4 hours as needed for pain.

2. Albuterol inhalation as needed.

3. Aspirin 81 mg daily.

4. Ceftriaxone 1 g daily.

5. Plavix 75 mg daily.

6. Flexeril 10 mg 3 times a day as needed for pain.

7. Neurontin 300 mg 3 times a day.

8. Heparin 5000 subcutaneous q.8 hourly.

9. NovoLog supplemental scale along with 30 units 3 times a day.

10.Levemir 30 units twice a day.

11.Keppra 500 mg twice a day.

12.Percocet 5/325 mg every 4 hours as needed for pain.

13.Crestor 20 mg at bedtime.

14.Ambien 5 mg at bedtime as needed.

 

LABORATORY DATA:  Labs reviewed.

1. WBC 10.3, improved from 13.4; hemoglobin 10.6; hematocrit 31.4; platelet

    count 259.

2. Sodium 130, potassium 4.5, chloride 103, bicarb 21, BUN 31, creatinine 1.1,

    glucose 246.

3. Awaiting for urine culture report.

 

ASSESSMENT:

1. Type 2 diabetes mellitus, uncontrolled.

2. Hyponatremia.

3. Acute renal failure, improved.

4. Leukocytosis, improved.

5. Urinary tract infection.

6. Right ankle sprain.

7. Hypertension with low blood pressure.

8. Morbid obesity.

9. History of chronic opioid use.

10.History of seizures.

 

PLAN:

1. Type 2 diabetes mellitus, seems to be improved at this time.  She is

    started back on the insulin regimen.  We will continue with current dose of

    insulin.  We will further dose adjust the medications.  She continues to

    have elevated blood sugars but much improved from the time of admission.

    We will increase the Levemir to 40 units twice a day for better control of

    the blood sugars.

2. Hypertension.  The patient continues to have low blood pressure.  Try to

    avoid any antihypertensive medications.  Keep her hydrated with IV fluids.

3. Hyponatremia.  This is much improved.  Her sodium at admission was 127, now

    at 130.  Continue with IV normal saline.  Recheck a BMP in the a.m.

4. Urinary tract infection.  The patient is noted to have leukocytosis, still

    awaiting for urine culture.  She is empirically started on ceftriaxone.

    Continue the same.

5. Right ankle sprain.  The patient fell down at her home before coming to the

    ER, and no fracture noted on the right ankle x-ray.  The patient could have

    sprained the ankle, will have an Ace wrap done.  We will have Physical

    Therapy and Occupational Therapy evaluate and treat the patient as the

    patient is having difficulty ambulating.  She may need a walker for few

    days till this pain gets resolved.

6. Discussed with family members at bedside.

 

DD:  05/11/2018 12:27:34

DT:  05/11/2018 13:36:17

[facility]

Job #:  [id number]

## 2018-05-11 NOTE — EDM.PDOC
ED HPI GENERAL MEDICAL PROBLEM





- General


Chief Complaint: Lower Extremity Injury/Pain


Stated Complaint: [id number] fell and hurt rt ankle


Time Seen by Provider: 05/10/18 21:40


Source of Information: Reports: Patient, Family


History Limitations: Reports: No Limitations





- History of Present Illness


INITIAL COMMENTS - FREE TEXT/NARRATIVE: 





ED with c/o pain to right ankle after falling. Stated has been feeling weak 

this week and fell twisting ankle. Blood sugars have been up to 500's. Denied 

fevers cough, skin sores or urinary complainants. Notes decreased appetite and 

trying to eat less to keep blood sugars down.


  ** Right Ankle


Pain Score (Numeric/FACES): 5





- Related Data


 Allergies











Allergy/AdvReac Type Severity Reaction Status Date / Time


 


No Known Allergies Allergy   Verified 18 19:58











Home Meds: 


 Home Meds





Albuterol [Proair HFA] 2 puff INH Q4HR PRN 14 [History]


Fluticasone Propionate [Flovent  MCG] 2 puff INH BID 14 [History]


sitaGLIPtin Phos/Metformin HCl [Janumet 50-1,000 MG] 1,000 mg PO DAILY 14 

[History]


Insulin Aspart [Novolog Flexpen] 30 units SQ TID PRN 16 [History]


Insulin Glarg,Human.Rec.Analog [Lantus] 30 units SQ BID 16 [History]


Gabapentin 600 mg PO TID 16 [History]


Aspirin [Ecotrin] 81 mg PO DAILY 17 [History]


Clopidogrel Bisulfate [Clopidogrel] 1 tab PO DAILY 18 [History]


Rosuvastatin Calcium 1 tab PO BEDTIME 18 [History]


levETIRAcetam [Keppra] 1 tab PO BID 18 [History]


Acetaminophen [Tylenol] 650 mg PO Q4H PRN  tablet 03/10/18 [Rx]


Cyclobenzaprine [Flexeril] 10 mg PO TID PRN  tablet 03/10/18 [Rx]


Gabapentin [Neurontin] 300 mg PO TID #21 capsule 03/10/18 [Rx]


Insulin Aspart [NovoLOG] 0 unit SUBCUT TIDAC  pen 03/10/18 [Rx]


Zolpidem [Ambien] 5 mg PO BEDTIME PRN #7 tablet 03/10/18 [Rx]











Past Medical History


HEENT History: Reports: None


Cardiovascular History: Reports: Hypertension, Other (See Below)


Other Cardiovascular History: hx rhuematic fever


Respiratory History: Reports: Asthma


Gastrointestinal History: Reports: None


Genitourinary History: Reports: None


OB/GYN History: Reports: Pregnancy


Musculoskeletal History: Reports: Back Pain, Chronic, Osteoarthritis


Neurological History: Reports: Neuropathy, Diabetic, Neuropathy, Peripheral, 

Seizure, TIA


Other Neuro History: mini strokes


Psychiatric History: Reports: None


Endocrine/Metabolic History: Reports: Diabetes, Type II, IDDM, Obesity/BMI 30+


Hematologic History: Reports: None


Immunologic History: Reports: None


Oncologic (Cancer) History: Reports: None


Dermatologic History: Reports: None





- Infectious Disease History


Infectious Disease History: Reports: Chicken Pox





- Past Surgical History


Head Surgeries/Procedures: Reports: None


Female  Surgical History: Reports:  Section





Social & Family History





- Family History


Family Medical History: Noncontributory


Respiratory: Reports: Asthma


Other Respiratory Family Hisory: Brother


Endocrine/Metabolic: Reports: Diabetes, type II


Other Endocrine/Metabolic Family History: Brother





- Tobacco Use


Smoking Status *Q: Never Smoker


Second Hand Smoke Exposure: Yes





- Caffeine Use


Caffeine Use: Reports: None





- Recreational Drug Use


Recreational Drug Use: No





- Sexual History


Sexual History: Reports: Sexually Active, Single Partner





- Living Situation & Occupation


Living situation: Reports: with Significant Other, with Family


Occupation: Unemployed





Review of Systems





- Review of Systems


Review Of Systems: See Below


Constitutional: Reports: Weakness


Eyes: Reports: No Symptoms


Ears: Reports: No Symptoms


Nose: Reports: No Symptoms


Mouth/Throat: Reports: No Symptoms


Respiratory: Reports: No Symptoms


Cardiovascular: Reports: No Symptoms


GI/Abdominal: Reports: No Symptoms


Genitourinary: Reports: No Symptoms


Skin: Denies: Rash, Wound


Neurological: Reports: No Symptoms


Psychiatric: Reports: No Symptoms





ED EXAM, GENERAL





- Physical Exam


Exam: See Below


Exam Limited By: No Limitations


General Appearance: Alert, Mild Distress


Eye Exam: Bilateral Eye: EOMI, PERRL (5 sluggish)


Ears: Normal External Exam


Nose: Normal Inspection


Throat/Mouth: Normal Inspection


Head: Atraumatic, Normocephalic


Neck: Normal Inspection


Respiratory/Chest: No Respiratory Distress, Lungs Clear, Normal Breath Sounds


Cardiovascular: Normal Peripheral Pulses, Regular Rate, Rhythm


GI/Abdominal: Normal Bowel Sounds


Extremities: Normal Inspection.  No: Normal Range of Motion (pain elicited with 

minimal movement of right ankle, minimal swelling, no deformity)


Neurological: Alert, Oriented


Psychiatric: Normal Affect, Normal Mood


Skin Exam: Warm, Dry, Intact, Normal Color, No Rash, Other (thick callouses to 

bottom of feet)





Course





- Vital Signs


Last Recorded V/S: 


 Last Vital Signs











Temp  98.2 F   18 03:00


 


Pulse  96   18 03:00


 


Resp  14   18 03:00


 


BP  100/40 L  18 03:00


 


Pulse Ox  100   18 03:00














- Orders/Labs/Meds


Orders: 


 Active Orders 24 hr











 Category Date Time Status


 


 UA W/MICROSCOPIC [URIN] Stat Lab  18 00:41 Ordered








 Medication Orders





Acetaminophen (Tylenol)  650 mg PO Q4H PRN


   PRN Reason: Pain (Mild 1-3)/fever


Albuterol (Proventil Hfa)  0 gm INH Q4H PRN


   PRN Reason: Dyspnea


Aspirin (Halfprin)  81 mg PO DAILY Replaced by [facility]


Clopidogrel Bisulfate (Plavix)  75 mg PO DAILY Replaced by [facility]


Cyclobenzaprine HCl (Flexeril)  10 mg PO TID PRN


   PRN Reason: pain - moderate


Dextrose/Water (Dextrose 50% In Water)  50 ml IVPUSH ONETIME PRN


   PRN Reason: Hypoglycemia


Gabapentin (Neurontin)  300 mg PO TID Replaced by [facility]


Heparin Sodium (Porcine) (Heparin Sodium)  5,000 units SUBCUT Q8HR Replaced by [facility]


   Last Admin: 18 05:44  Dose: 5,000 units


Sodium Chloride (Normal Saline)  1,000 mls @ 100 mls/hr IV DAILY Replaced by [facility]


   Last Admin: 18 02:17  Dose: 100 mls/hr


Ceftriaxone Sodium 1,000 mg/ (Sodium Chloride)  100 mls @ 200 mls/hr IV Q24H Replaced by [facility]


   Last Admin: 18 02:17  Dose: 200 mls/hr


Insulin Aspart (Novolog)  30 unit SUBCUT TID Replaced by [facility]


Insulin Aspart (Novolog)  0 unit SUBCUT TID@0800,1200,1700 BALBIR; Protocol


Insulin Aspart (Novolog)  0 unit SUBCUT BEDTIME BALBIR; Protocol


Insulin Detemir (Levemir)  30 unit SUBCUT BID Replaced by [facility]


Levetiracetam (Keppra)  500 mg PO BID BALBIR


Mometasone Furoate (Asmanex 220 Mcg)  1 puff INH DAILY BALBIR


Oxycodone/Acetaminophen (Percocet 325-5 Mg)  1 tab PO Q4H PRN


   PRN Reason: Pain (moderate 4-6)


   Last Admin: 18 02:14  Dose: 1 tab


Rosuvastatin Calcium (Crestor)  20 mg PO BEDTIME BALBIR


Zolpidem Tartrate (Ambien)  5 mg PO BEDTIME PRN


   PRN Reason: Sleep








Labs: 


 Laboratory Tests











  05/10/18 05/10/18 05/10/18 Range/Units





  21:36 21:40 21:40 


 


WBC   13.4 H   (5.0-10.0)  10^3/uL


 


RBC   4.14 L   (4.2-5.4)  10^6/uL


 


Hgb   12.2   (12.0-16.0)  g/dL


 


Hct   35.6 L   (37.0-47.0)  %


 


MCV   86.0   ()  fL


 


MCH   29.5   (27.0-34.0)  pg


 


MCHC   34.3   (33.0-35.0)  g/dL


 


Plt Count   317  D   (150-450)  10^3/uL


 


Neut % (Auto)   72.3   (42.2-75.2)  %


 


Lymph % (Auto)   17.4 L   (20.5-50.1)  %


 


Mono % (Auto)   5.1   (2-8)  %


 


Eos % (Auto)   4.9 H   (1.0-3.0)  %


 


Baso % (Auto)   0.3   (0.0-1.0)  %


 


ABG pH     (7.35-7.45)  


 


ABG pCO2     (35-45)  mmHg


 


ABG pO2     ()  mmHg


 


ABG HCO3     (22-26)  mmol/L


 


ABG O2 Saturation     ()  %


 


ABG Base Excess     ((-2)-(+3))  mmol/L


 


O2 Delivery Device     


 


Sodium    127 L  (135-145)  mmol/L


 


Potassium    4.1  (3.6-5.0)  mmol/L


 


Chloride    100 L  (101-111)  mmol/L


 


Carbon Dioxide    21.0  (21.0-31.0)  mmol/L


 


Anion Gap    10.1  


 


BUN    39 H  (7-18)  mg/dL


 


Creatinine    1.4 H  (0.6-1.3)  mg/dL


 


Est Cr Clr Drug Dosing    51.73  mL/min


 


Estimated GFR (MDRD)    41  


 


BUN/Creatinine Ratio    27.85  


 


Glucose    375 H  ()  mg/dL


 


POC Glucose  463 H*    ()  mg/dl


 


Lactic Acid     (0.5-2.2)  mmol/L


 


Calcium    8.1 L  (8.4-10.2)  mg/dl


 


Total Bilirubin    0.3  (0.2-1.0)  mg/dL


 


AST    14  (10-42)  IU/L


 


ALT    10  (10-60)  IU/L


 


Alkaline Phosphatase    127 H  ()  IU/L


 


Total Protein    7.1  (6.7-8.2)  g/dl


 


Albumin    3.1 L  (3.2-5.5)  g/dl


 


Globulin    4.0  


 


Albumin/Globulin Ratio    0.78  


 


Urine Color     (YELLOW)  


 


Urine Appearance     (CLEAR)  


 


Urine pH     (5.0-9.0)  


 


Ur Specific Gravity     (1.005-1.030)  


 


Urine Protein     (NEGATIVE)  


 


Urine Glucose (UA)     (NEGATIVE)  


 


Urine Ketones     (NEGATIVE)  


 


Urine Occult Blood     (NEGATIVE)  


 


Urine Nitrite     (NEGATIVE)  


 


Urine Bilirubin     (NEGATIVE)  


 


Urine Urobilinogen     (0.2-1.0)  mg/dL


 


Ur Leukocyte Esterase     (NEGATIVE)  


 


Urine RBC     /HPF


 


Urine WBC     (0-5/HPF)  /HPF


 


Ur Epithelial Cells     /HPF


 


Urine Bacteria     (0-FEW/HPF)  /HPF


 


Ketones    Negative  














  05/10/18 05/10/18 05/10/18 Range/Units





  21:40 21:53 22:46 


 


WBC     (5.0-10.0)  10^3/uL


 


RBC     (4.2-5.4)  10^6/uL


 


Hgb     (12.0-16.0)  g/dL


 


Hct     (37.0-47.0)  %


 


MCV     ()  fL


 


MCH     (27.0-34.0)  pg


 


MCHC     (33.0-35.0)  g/dL


 


Plt Count     (150-450)  10^3/uL


 


Neut % (Auto)     (42.2-75.2)  %


 


Lymph % (Auto)     (20.5-50.1)  %


 


Mono % (Auto)     (2-8)  %


 


Eos % (Auto)     (1.0-3.0)  %


 


Baso % (Auto)     (0.0-1.0)  %


 


ABG pH   7.32 L   (7.35-7.45)  


 


ABG pCO2   37   (35-45)  mmHg


 


ABG pO2   62 L   ()  mmHg


 


ABG HCO3   18.4 L   (22-26)  mmol/L


 


ABG O2 Saturation   93 L   ()  %


 


ABG Base Excess   -7 L   ((-2)-(+3))  mmol/L


 


O2 Delivery Device   Room air   


 


Sodium     (135-145)  mmol/L


 


Potassium     (3.6-5.0)  mmol/L


 


Chloride     (101-111)  mmol/L


 


Carbon Dioxide     (21.0-31.0)  mmol/L


 


Anion Gap     


 


BUN     (7-18)  mg/dL


 


Creatinine     (0.6-1.3)  mg/dL


 


Est Cr Clr Drug Dosing     mL/min


 


Estimated GFR (MDRD)     


 


BUN/Creatinine Ratio     


 


Glucose     ()  mg/dL


 


POC Glucose    255 H  ()  mg/dl


 


Lactic Acid  1.7    (0.5-2.2)  mmol/L


 


Calcium     (8.4-10.2)  mg/dl


 


Total Bilirubin     (0.2-1.0)  mg/dL


 


AST     (10-42)  IU/L


 


ALT     (10-60)  IU/L


 


Alkaline Phosphatase     ()  IU/L


 


Total Protein     (6.7-8.2)  g/dl


 


Albumin     (3.2-5.5)  g/dl


 


Globulin     


 


Albumin/Globulin Ratio     


 


Urine Color     (YELLOW)  


 


Urine Appearance     (CLEAR)  


 


Urine pH     (5.0-9.0)  


 


Ur Specific Gravity     (1.005-1.030)  


 


Urine Protein     (NEGATIVE)  


 


Urine Glucose (UA)     (NEGATIVE)  


 


Urine Ketones     (NEGATIVE)  


 


Urine Occult Blood     (NEGATIVE)  


 


Urine Nitrite     (NEGATIVE)  


 


Urine Bilirubin     (NEGATIVE)  


 


Urine Urobilinogen     (0.2-1.0)  mg/dL


 


Ur Leukocyte Esterase     (NEGATIVE)  


 


Urine RBC     /HPF


 


Urine WBC     (0-5/HPF)  /HPF


 


Ur Epithelial Cells     /HPF


 


Urine Bacteria     (0-FEW/HPF)  /HPF


 


Ketones     














  18 Range/Units





  00:41 


 


WBC   (5.0-10.0)  10^3/uL


 


RBC   (4.2-5.4)  10^6/uL


 


Hgb   (12.0-16.0)  g/dL


 


Hct   (37.0-47.0)  %


 


MCV   ()  fL


 


MCH   (27.0-34.0)  pg


 


MCHC   (33.0-35.0)  g/dL


 


Plt Count   (150-450)  10^3/uL


 


Neut % (Auto)   (42.2-75.2)  %


 


Lymph % (Auto)   (20.5-50.1)  %


 


Mono % (Auto)   (2-8)  %


 


Eos % (Auto)   (1.0-3.0)  %


 


Baso % (Auto)   (0.0-1.0)  %


 


ABG pH   (7.35-7.45)  


 


ABG pCO2   (35-45)  mmHg


 


ABG pO2   ()  mmHg


 


ABG HCO3   (22-26)  mmol/L


 


ABG O2 Saturation   ()  %


 


ABG Base Excess   ((-2)-(+3))  mmol/L


 


O2 Delivery Device   


 


Sodium   (135-145)  mmol/L


 


Potassium   (3.6-5.0)  mmol/L


 


Chloride   (101-111)  mmol/L


 


Carbon Dioxide   (21.0-31.0)  mmol/L


 


Anion Gap   


 


BUN   (7-18)  mg/dL


 


Creatinine   (0.6-1.3)  mg/dL


 


Est Cr Clr Drug Dosing   mL/min


 


Estimated GFR (MDRD)   


 


BUN/Creatinine Ratio   


 


Glucose   ()  mg/dL


 


POC Glucose   ()  mg/dl


 


Lactic Acid   (0.5-2.2)  mmol/L


 


Calcium   (8.4-10.2)  mg/dl


 


Total Bilirubin   (0.2-1.0)  mg/dL


 


AST   (10-42)  IU/L


 


ALT   (10-60)  IU/L


 


Alkaline Phosphatase   ()  IU/L


 


Total Protein   (6.7-8.2)  g/dl


 


Albumin   (3.2-5.5)  g/dl


 


Globulin   


 


Albumin/Globulin Ratio   


 


Urine Color  Yellow  (YELLOW)  


 


Urine Appearance  Cloudy  (CLEAR)  


 


Urine pH  5.0  (5.0-9.0)  


 


Ur Specific Gravity  1.020  (1.005-1.030)  


 


Urine Protein  >=300 H  (NEGATIVE)  


 


Urine Glucose (UA)  >=1000 H  (NEGATIVE)  


 


Urine Ketones  Negative  (NEGATIVE)  


 


Urine Occult Blood  Small H  (NEGATIVE)  


 


Urine Nitrite  Negative  (NEGATIVE)  


 


Urine Bilirubin  Negative  (NEGATIVE)  


 


Urine Urobilinogen  0.2  (0.2-1.0)  mg/dL


 


Ur Leukocyte Esterase  Small H  (NEGATIVE)  


 


Urine RBC  0-5  /HPF


 


Urine WBC  Semi-packed H  (0-5/HPF)  /HPF


 


Ur Epithelial Cells  Many H  /HPF


 


Urine Bacteria  Many H  (0-FEW/HPF)  /HPF


 


Ketones   











Meds: 


Medications











Generic Name Dose Route Start Last Admin





  Trade Name Freq  PRN Reason Stop Dose Admin


 


Acetaminophen  650 mg  18 01:23  





  Tylenol  PO   





  Q4H PRN   





  Pain (Mild 1-3)/fever   





     





     





     


 


Albuterol  0 gm  18 01:23  





  Proventil Hfa  INH   





  Q4H PRN   





  Dyspnea   





     





     





     


 


Aspirin  81 mg  18 09:00  





  Halfprin  PO   





  DAILY Replaced by [facility]   





     





     





     





     


 


Clopidogrel Bisulfate  75 mg  18 09:00  





  Plavix  PO   





  DAILY Replaced by [facility]   





     





     





     





     


 


Cyclobenzaprine HCl  10 mg  18 01:23  





  Flexeril  PO   





  TID PRN   





  pain - moderate   





     





     





     


 


Dextrose/Water  50 ml  18 01:26  





  Dextrose 50% In Water  IVPUSH   





  ONETIME PRN   





  Hypoglycemia   





     





     





     


 


Gabapentin  300 mg  18 09:00  





  Neurontin  PO   





  TID BALBIR   





     





     





     





     


 


Heparin Sodium (Porcine)  5,000 units  18 06:00  18 05:44





  Heparin Sodium  SUBCUT   5,000 units





  Q8HR BALBIR   Administration





     





     





     





     


 


Sodium Chloride  1,000 mls @ 100 mls/hr  18 01:19  18 02:17





  Normal Saline  IV   100 mls/hr





  DAILY BALBIR   Administration





     





     





     





     


 


Ceftriaxone Sodium 1,000 mg/  100 mls @ 200 mls/hr  18 02:00  18 02:

17





  Sodium Chloride  IV   200 mls/hr





  Q24H BALBIR   Administration





     





     





     





     


 


Insulin Aspart  30 unit  18 09:00  





  Novolog  SUBCUT   





  TID BALBIR   





     





     





     





     


 


Insulin Aspart  0 unit  18 08:00  





  Novolog  SUBCUT   





  TID@0800,1200,1700 Replaced by [facility]   





     





     





  Protocol   





     


 


Insulin Aspart  0 unit  18 21:00  





  Novolog  SUBCUT   





  BEDTIME Replaced by [facility]   





     





     





  Protocol   





     


 


Insulin Detemir  30 unit  18 09:00  





  Levemir  SUBCUT   





  BID Replaced by [facility]   





     





     





     





     


 


Levetiracetam  500 mg  18 09:00  





  Keppra  PO   





  BID Replaced by [facility]   





     





     





     





     


 


Mometasone Furoate  1 puff  18 09:00  





  Asmanex 220 Mcg  INH   





  DAILY Replaced by [facility]   





     





     





     





     


 


Oxycodone/Acetaminophen  1 tab  18 01:19  18 02:14





  Percocet 325-5 Mg  PO   1 tab





  Q4H PRN   Administration





  Pain (moderate 4-6)   





     





     





     


 


Rosuvastatin Calcium  20 mg  18 21:00  





  Crestor  PO   





  BEDTIME Replaced by [facility]   





     





     





     





     


 


Zolpidem Tartrate  5 mg  18 01:23  





  Ambien  PO   





  BEDTIME PRN   





  Sleep   





     





     





     














Discontinued Medications














Generic Name Dose Route Start Last Admin





  Trade Name Genaro  PRN Reason Stop Dose Admin


 


Acetaminophen  650 mg  05/10/18 23:13  18 00:18





  Tylenol  PO  05/10/18 23:14  650 mg





  NOW ONE   Administration





     





     





     





     


 


Sodium Chloride  1,000 mls @ 999 mls/hr  05/10/18 21:32  05/10/18 21:46





  Normal Saline  IV  05/10/18 22:32  999 mls/hr





  .BOLUS ONE   Administration





     





     





     





     


 


Sodium Chloride  1,000 mls @ 999 mls/hr  05/10/18 23:37  05/10/18 23:41





  Normal Saline  IV  18 00:37  999 mls/hr





  .BOLUS ONE   Administration





     





     





     





     


 


Insulin Aspart  0 unit  18 09:00  





  Novolog  SUBCUT   





  QID Replaced by [facility]   





     





     





  Protocol   





     


 


Insulin Aspart  0 unit  18 02:00  18 02:13





  Novolog  SUBCUT  18 02:01  Not Given





  ONETIME ONE   





     





     





  Protocol   





     


 


Insulin Human Regular  10 unit  05/10/18 22:00  05/10/18 22:12





  Humulin R  IV  05/10/18 22:01  10 units





  ONETIME ONE   Administration





     





     





  Protocol   





     


 


Non-Formulary Medication  30 units  18 09:00  





  Insulin Glarg,Human.Rec.Analog  SQ   





  BID Replaced by [facility]   





     





     





     





     


 


Non-Formulary Medication  1,000 mg  18 09:00  





  Sitagliptin Phos/Metformin Hcl [Janumet 50-1,000 Mg]  PO   





  DAILY Replaced by [facility]   





     





     





     





     














- Radiology Interpretation


Free Text/Narrative:: 





right ankle: no fracture or dislocation





- Re-Assessments/Exams


Free Text/Narrative Re-Assessment/Exam: 





18 07:47


Dr Carey accepting of patient for ongoing management of hyperglycemia








Departure





- Departure


Time of Disposition: 00:45


Disposition: Admitted As Inpatient 64


Condition: Fair


Clinical Impression: 


Sprain of right ankle


Qualifiers:


 Encounter type: initial encounter Involved ligament of ankle: unspecified 

ligament Qualified Code(s): S93.401A - Sprain of unspecified ligament of right 

ankle, initial encounter





Fall at home


Qualifiers:


 Encounter type: initial encounter Qualified Code(s): W19.XXXA - Unspecified 

fall, initial encounter





Diabetes mellitus type 2, uncontrolled


Qualifiers:


 Diabetes mellitus long term insulin use: with long term use Diabetes mellitus 

complication status: with hyperglycemia Qualified Code(s): E11.65 - Type 2 

diabetes mellitus with hyperglycemia








- Discharge Information





- My Orders


Last 24 Hours: 


My Active Orders





18 00:41


UA W/MICROSCOPIC [URIN] Stat 














- Assessment/Plan


Last 24 Hours: 


My Active Orders





18 00:41


UA W/MICROSCOPIC [URIN] Stat yes

## 2018-05-13 NOTE — PN
DATE:  05/13/2018

 

SUBJECTIVE:  Ms. Theresa Briones is a 42-year-old female with medical history

significant for hypertension, hyperlipidemia, type 2 diabetes mellitus, obesity,

admitted with uncontrolled diabetes, dehydration, and urinary tract infection

and having a fall at her home resulting in right ankle sprain.

 

For the last 24 hours, her right ankle pain seems to be improving.  She denies

any chest pain.  No shortness of breath.  No abdominal pain.  No nausea.  No

vomiting.  No diarrhea.

 

REVIEW OF SYSTEMS:

Cardiovascular, respiratory, gastrointestinal, neurology, constitutional were

all evaluated.

 

PHYSICAL EXAMINATION:

Vital Signs:  Temperature of 98.3, pulse of 93, blood pressure 161/78,

respiratory rate of 20, saturating at 99% on room air.

General Appearance:  The patient is well oriented to time, place, and person.

Follows commands spontaneously.

Cardiovascular System:  S1, S2 heard with normal intensity.  No gallops noted.

Respiratory System:  Clear to auscultation bilaterally.  No wheeze.  No

crepitations.

Abdomen:  Soft.  Bowel sounds positive.  Nontender.  No rigidity.

Extremities:  No edema, bilateral lower extremities.  Ace wrap to the right

ankle.

 

LABORATORY DATA:  Sodium 138, potassium 3.9, chloride 109, bicarb 22, BUN 13,

creatinine 0.9, glucose 185.

 

Urine culture positive for E. coli Enterococcus faecalis and mixed gram-positive

isolate, all sensitive to ciprofloxacin.

 

MEDICATIONS:  Reviewed.  Discontinue the IV ceftriaxone and started on oral

ciprofloxacin.

 

ASSESSMENT:

1. Urinary tract infection.

2. Acute renal failure.

3. Type 2 diabetes mellitus.

4. Hypertension.

5. Hyperlipidemia.

6. Anemia.

7. Chronic pain syndrome.

8. Right ankle sprain.

9. Morbid obesity.

10.History of seizures.

 

PLAN:

1. Urinary tract infection.  The patient is noted to have multi-marii in the

    urine culture with E. coli and Enterococcus, all sensitive to

    ciprofloxacin.  We will discontinue the IV ceftriaxone and start her on

    oral ciprofloxacin.

2. Right ankle sprain.  Continue with Ace wrap.  The patient's pain seems to

    be improved.  The patient is encouraged to use cold packs as needed for

    controlling the pain.  Continue pain medications also.

3. Type 2 diabetes mellitus, improving.  We had to increase her insulin

    regimen on this admission.  The patient was explained about changes in her

    medications.  She will continue with consistent carbohydrate diet.

4. Acute renal failure, improved with IV hydration.  The patient was noted to

    have acute renal failure at the time of admission.  Her creatinine is back

    to her baseline function.

5. Hyponatremia, resolved.  The patient's sodium is back to baseline.

6. History of seizures, continue with Keppra for now.

7. Possible discharge in a.m. if she remains hemodynamically stable.

 

DD:  05/13/2018 12:00:42

DT:  05/13/2018 14:31:42

[facility]

Job #:  [id number]

## 2018-05-14 NOTE — DISCH
DATE OF ADMISSION: 5/11/18.



DATE OF DISCHARGE:  5/14/18. 

 

ADMITTING DIAGNOSES:

1. Type 2 diabetes mellitus, uncontrolled with severe hyperglycemia.

2. Urinary tract infection.

3. Hyponatremia.

4. Acute renal failure.

5. Hypotension.

6. Obesity.

 

DISCHARGE DIAGNOSES:

1. Urinary tract infection with multi-marii including E. coli Enterococcus,

    sensitive to ciprofloxacin.

2. Hypotension episode resolved.

3. Acute renal failure, resolved.

4. Hyponatremia, resolved.

5. Type 2 diabetes mellitus.

 

HISTORY OF PRESENT ILLNESS:  Ms. Theresa Briones is a 42-year-old female with

medical history significant for hypertension, hyperlipidemia, type 2 diabetes

mellitus, diabetes complicated with neuropathy, obesity, history of seizure

disorder, presented to the hospital with complaints of increasing weakness,

tiredness, and dizziness and was noted to have urinary tract infection leading

to hypotensive episode.  She was also noted to have acute renal failure with

creatinine of 1.4 at the time of admission with elevated BUN up to 39 and

hyponatremia with sodium of 127.  The patient responded well to the IV fluids.

Her sodium at the time of discharge was 138 and creatinine back to her baseline

function, 0.9.  She was also noted to have leukocytosis secondary to urinary

tract infection.  WBC count was 13.4 at the time of admission, improved to 8.1.

Her urine culture was positive for E. coli Enterococcus and mixed gram-positive

isolated which were all sensitive to ciprofloxacin.  She was changed to oral

ciprofloxacin at the time of discharge.  She was also noted to have ankle pain

after she fell down, no ankle fracture noted on x-ray of the ankle.  She had Ace

wrap done and then she was able to ambulate well without any difficulty.  She is

discharged home in stable condition.  She is advised to continue ciprofloxacin

for next 5 days.  She is advised to follow with the primary care physician in

next 1 week of time.

 

DISCHARGE MEDICATIONS:  Include,

1. Tylenol 650 every 4 hours as needed for pain and fever.

2. Oxycodone 5/325 mg every 4 hours as needed for pain for next 5 days.

3. Albuterol 2 puffs inhalation every 4 hours as needed for dyspnea.

4. Aspirin 81 mg daily.

5. Ciprofloxacin 500 mg twice a day.

6. Plavix 75 mg once a day.

7. Flexeril 10 mg 3 times a day as needed.

8. Flovent 2 puff inhalation twice daily.

9. Neurontin 600 mg 3 times a day.

10.Lantus 30 units twice a day.

11.NovoLog 30 units 3 times a day and supplemental scale as needed.

12.Rosuvastatin one tablet 20 mg daily at bedtime.

13.Ambien 5 mg at bedtime as needed for sleep.

14.Keppra 500 mg twice daily.

15.Janumet 1000 mg daily.

 

PHYSICAL EXAMINATION:

On the day of discharge,

Vital Signs:  Temperature of 97, pulse of 99, blood pressure 135/80, respiratory

rate of 18, saturating at 100% on room air.

General Appearance:  The patient is well oriented to time, place, and person.

Follows commands spontaneously.

Cardiovascular System:  S1, S2 heard with normal intensity.  No gallops.

Respiratory System:  Clear to auscultation bilaterally.  No wheeze.  No

crepitations.

Abdomen:  Soft.  Bowel sounds positive.  Nontender.  No rigidity.

Extremities:  No edema, bilateral lower extremities.

Neurology:  No gross focal neurological deficit.

 

CONDITION ON ADMISSION:  Poor.

 

CONDITION ON DISCHARGE:  Stable.

 

Discharged to home.

 

ACTIVITY:  As tolerated.

 

DIET:  Consistent carbohydrate diet and low-sodium diet.

 

FOLLOWUP:  Follow up with primary care physician in next 1 week of time.

 

TIME SPENT:  Spent over 35 minutes of time in evaluating and treating this

patient and making discharge plans.

 

DD:  05/14/2018 11:21:15

DT:  05/14/2018 12:01:51

[facility]

Job #:  [id number]

MTDD

## 2018-05-14 NOTE — EKG
05/10/2018- PAGE, DIAMOND SAUCEDO -

 

FINDINGS:  A 12-lead EKG shows normal sinus rhythm with sinus tachycardia with

heart rate of 111.  Nonspecific ST-T wave changes noted on leads V2 and V3.  No

significant ST elevation or ST depression noted on this 12-lead EKG.

 

DD:  05/12/2018 12:34:53

DT:  05/12/2018 13:16:13

[facility]

Job #:  [id number]

## 2018-08-16 ENCOUNTER — HOSPITAL ENCOUNTER (EMERGENCY)
Dept: HOSPITAL 43 - DL.ED | Age: 43
LOS: 1 days | Discharge: HOME | End: 2018-08-17
Payer: MEDICARE

## 2018-08-16 VITALS — DIASTOLIC BLOOD PRESSURE: 83 MMHG | SYSTOLIC BLOOD PRESSURE: 101 MMHG
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
## 2018-08-16 DIAGNOSIS — S16.1XXA: Primary | ICD-10-CM

## 2018-08-16 DIAGNOSIS — Z79.4: ICD-10-CM

## 2018-08-16 DIAGNOSIS — Z79.82: ICD-10-CM

## 2018-08-16 DIAGNOSIS — J45.909: ICD-10-CM

## 2018-08-16 DIAGNOSIS — S39.012A: ICD-10-CM

## 2018-08-16 DIAGNOSIS — E11.9: ICD-10-CM

## 2018-08-16 DIAGNOSIS — Z79.899: ICD-10-CM

## 2018-08-16 PROCEDURE — 96372 THER/PROPH/DIAG INJ SC/IM: CPT

## 2018-08-16 PROCEDURE — 72100 X-RAY EXAM L-S SPINE 2/3 VWS: CPT

## 2018-08-16 PROCEDURE — 99283 EMERGENCY DEPT VISIT LOW MDM: CPT

## 2018-08-16 PROCEDURE — 72040 X-RAY EXAM NECK SPINE 2-3 VW: CPT
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
## 2018-08-21 ENCOUNTER — HOSPITAL ENCOUNTER (EMERGENCY)
Dept: HOSPITAL 43 - DL.ED | Age: 43
Discharge: HOME | End: 2018-08-21
Payer: MEDICARE

## 2018-08-21 VITALS — SYSTOLIC BLOOD PRESSURE: 157 MMHG | DIASTOLIC BLOOD PRESSURE: 95 MMHG
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
## 2018-08-21 DIAGNOSIS — E11.40: ICD-10-CM

## 2018-08-21 DIAGNOSIS — F17.210: ICD-10-CM

## 2018-08-21 DIAGNOSIS — Z79.899: ICD-10-CM

## 2018-08-21 DIAGNOSIS — T23.212A: Primary | ICD-10-CM

## 2018-08-21 DIAGNOSIS — S63.502A: ICD-10-CM

## 2018-08-21 DIAGNOSIS — Z79.4: ICD-10-CM
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
## 2018-08-21 DIAGNOSIS — I10: ICD-10-CM

## 2018-08-21 DIAGNOSIS — Z79.82: ICD-10-CM

## 2018-08-21 DIAGNOSIS — T23.202A: ICD-10-CM
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
## 2018-10-03 ENCOUNTER — HOSPITAL ENCOUNTER (EMERGENCY)
Dept: HOSPITAL 43 - DL.ED | Age: 43
Discharge: NURSING, REHAB OR LONG-TERM CARE | End: 2018-10-03
Payer: MEDICARE

## 2018-10-03 VITALS — DIASTOLIC BLOOD PRESSURE: 68 MMHG | SYSTOLIC BLOOD PRESSURE: 110 MMHG

## 2018-10-03 DIAGNOSIS — E66.01: ICD-10-CM

## 2018-10-03 DIAGNOSIS — Z79.82: ICD-10-CM

## 2018-10-03 DIAGNOSIS — Z79.4: ICD-10-CM

## 2018-10-03 DIAGNOSIS — R29.818: Primary | ICD-10-CM

## 2018-10-03 DIAGNOSIS — E11.65: ICD-10-CM

## 2018-10-03 DIAGNOSIS — R19.7: ICD-10-CM

## 2018-10-03 DIAGNOSIS — E11.40: ICD-10-CM

## 2018-10-03 DIAGNOSIS — I10: ICD-10-CM

## 2018-10-03 LAB
ANION GAP SERPL CALC-SCNC: 12 MMOL/L
CHLORIDE SERPL-SCNC: 98 MMOL/L (ref 101–111)
SODIUM SERPL-SCNC: 131 MMOL/L (ref 135–145)

## 2018-10-03 PROCEDURE — G0480 DRUG TEST DEF 1-7 CLASSES: HCPCS

## 2018-10-03 PROCEDURE — 70450 CT HEAD/BRAIN W/O DYE: CPT

## 2018-10-03 PROCEDURE — 85025 COMPLETE CBC W/AUTO DIFF WBC: CPT

## 2018-10-03 PROCEDURE — 99285 EMERGENCY DEPT VISIT HI MDM: CPT

## 2018-10-03 PROCEDURE — 96374 THER/PROPH/DIAG INJ IV PUSH: CPT

## 2018-10-03 PROCEDURE — 84484 ASSAY OF TROPONIN QUANT: CPT

## 2018-10-03 PROCEDURE — 82962 GLUCOSE BLOOD TEST: CPT

## 2018-10-03 PROCEDURE — 80053 COMPREHEN METABOLIC PANEL: CPT

## 2018-10-03 PROCEDURE — 82009 KETONE BODYS QUAL: CPT

## 2018-10-03 PROCEDURE — 83690 ASSAY OF LIPASE: CPT

## 2018-10-03 PROCEDURE — 96375 TX/PRO/DX INJ NEW DRUG ADDON: CPT

## 2018-10-03 PROCEDURE — 36415 COLL VENOUS BLD VENIPUNCTURE: CPT

## 2018-10-03 PROCEDURE — 93005 ELECTROCARDIOGRAM TRACING: CPT

## 2018-10-03 PROCEDURE — 81001 URINALYSIS AUTO W/SCOPE: CPT

## 2018-10-03 PROCEDURE — 80305 DRUG TEST PRSMV DIR OPT OBS: CPT

## 2018-10-03 PROCEDURE — 85730 THROMBOPLASTIN TIME PARTIAL: CPT

## 2018-10-03 PROCEDURE — 85610 PROTHROMBIN TIME: CPT

## 2018-10-03 PROCEDURE — 96361 HYDRATE IV INFUSION ADD-ON: CPT

## 2018-10-03 RX ADMIN — Medication PRN ML: at 18:40

## 2018-10-03 RX ADMIN — Medication PRN ML: at 17:55

## 2018-10-03 NOTE — EDM.PDOC
Scribed by Zulay Ochoa 10/03/18 1844 for Gumaro Kline MD





<Gumaro Kline - Last Filed: 10/03/18 18:49>





ED HPI GENERAL MEDICAL PROBLEM





- General


Chief Complaint: Neuro Symptoms/Deficits


Stated Complaint: POSSIBLE SMALL STROKE [id number]


Time Seen by Provider: 10/03/18 17:34


Source of Information: Reports: Patient, RN, RN Notes Reviewed


History Limitations: Reports: No Limitations





- History of Present Illness


INITIAL COMMENTS - FREE TEXT/NARRATIVE: 


Patient presents to ER with complaint of "possible stroke". Patient reports 

onset one week ago of profound right lower extremity weakness with some right 

upper extremity weakness and loss of bowel and bladder control. Pt states that 

she has had multiple falls over the past several days. Her blood sugars have 

been running over 380's. She denies fever, chills, N/V, cough, or abdominal 

pain.





Patient has history of morbid obesity, uncontrolled diabetes mellitus type II 

insulin dependent, tobacco dependence, CVA, seizure disorder, TIA, hypertension

, hyperlipidemia and asthma. She denies any headache, fever or chills. 


Onset: Today


Duration: Constant


Location: Reports: Upper Extremity, Right, Lower Extremity, Right


Quality: Reports: Other (denies pain)


Severity: Severe


Improves with: Reports: None


Worsens with: Reports: None


Associated Symptoms: Reports: No Other Symptoms


  ** Back


Pain Score (Numeric/FACES): 8





- Related Data


 Allergies











Allergy/AdvReac Type Severity Reaction Status Date / Time


 


No Known Allergies Allergy   Verified 18 22:23











Home Meds: 


 Home Meds





Albuterol [Proair HFA] 2 puff INH Q4HR PRN 14 [History]


Fluticasone Propionate [Flovent  MCG] 2 puff INH BID 14 [History]


sitaGLIPtin Phos/Metformin HCl [Janumet 50-1,000 MG] 1,000 mg PO DAILY 14 

[History]


Insulin Aspart [Novolog Flexpen] 40 units SQ TID PRN 16 [History]


Insulin Glarg,Human.Rec.Analog [Lantus] 45 units SQ BID 16 [History]


Gabapentin 600 mg PO TID 16 [History]


Aspirin [Ecotrin] 81 mg PO DAILY 17 [History]


Clopidogrel Bisulfate [Clopidogrel] 1 tab PO DAILY 18 [History]


Rosuvastatin Calcium 1 tab PO BEDTIME 18 [History]


levETIRAcetam [Keppra] 1 tab PO BID 18 [History]


Acetaminophen [Tylenol] 650 mg PO Q4H PRN  tablet 03/10/18 [Rx]


Zolpidem [Ambien] 5 mg PO BEDTIME PRN #7 tablet 03/10/18 [Rx]











Past Medical History


HEENT History: Reports: None


Cardiovascular History: Reports: Hypertension, Other (See Below)


Other Cardiovascular History: hx rhuematic fever


Respiratory History: Reports: Asthma


Gastrointestinal History: Reports: None


Genitourinary History: Reports: None


OB/GYN History: Reports: Pregnancy


Musculoskeletal History: Reports: Back Pain, Chronic, Osteoarthritis


Neurological History: Reports: Neuropathy, Diabetic, Neuropathy, Peripheral, 

Seizure, TIA


Other Neuro History: mini strokes


Psychiatric History: Reports: None


Endocrine/Metabolic History: Reports: Diabetes, Type II, IDDM, Obesity/BMI 30+


Hematologic History: Reports: None


Immunologic History: Reports: None


Oncologic (Cancer) History: Reports: None


Dermatologic History: Reports: None





- Infectious Disease History


Infectious Disease History: Reports: Chicken Pox





- Past Surgical History


Head Surgeries/Procedures: Reports: None


Female  Surgical History: Reports:  Section





Social & Family History





- Family History


Family Medical History: Noncontributory


Respiratory: Reports: Asthma


Other Respiratory Family Hisory: Brother


Endocrine/Metabolic: Reports: Diabetes, type II


Other Endocrine/Metabolic Family History: Brother





- Caffeine Use


Caffeine Use: Reports: Soda





- Alcohol Use


Alcohol Use History: No





- Recreational Drug Use


Recreational Drug Use: No





- Sexual History


Sexual History: Reports: Sexually Active, Single Partner





- Living Situation & Occupation


Living situation: Reports: with Significant Other, with Family


Occupation: Unemployed





ED ROS GENERAL





- Review of Systems


Review Of Systems: ROS reveals no pertinent complaints other than HPI.





ED EXAM, NEURO





- Physical Exam


Exam: See Below


Exam Limited By: No Limitations


General Appearance: Alert, Obese, Other (chronically ill appearing)


Eye Exam: Bilateral Eye: Normal Inspection


Nose: Normal Inspection, Normal Mucosa, No Blood


Throat/Mouth: No Airway Compromise, Other (dry oral membranes)


Head Exam: Atraumatic, Normocephalic


Neck: Normal Inspection, Supple, Non-Tender, Full Range of Motion


Respiratory/Chest: No Respiratory Distress, Lungs Clear, Normal Breath Sounds, 

No Accessory Muscle Use, Chest Non-Tender


Cardiovascular: Regular Rate, Rhythm, No Edema, Tachycardia


GI/Abdominal: Normal Bowel Sounds, Soft, Non-Tender, No Distention, No Abnormal 

Bruit


 (Female) Exam: Deferred


Rectal (Female) Exam: Deferred


Neurological: Alert, Oriented x 3, Abnormal Light Touch (rgiht upper and lower 

extremity), Other (weakness right and lower extremity. See RN documentation for 

NIH stroke scale.  Patient's speech is slightly slurred, unable to determine if 

is due to neurologic deficit or severe dry mouth. )


Back Exam: Decreased Range of Motion (chronic)


Extremities: Non-Tender, No Pedal Edema.  No: Joint Swelling, Karthik's Sign, 

Increased Warmth


Psychiatric: Normal Mood


Skin Exam: Warm, Dry, Intact, Normal Color, No Rash





EKG INTERPRETATION


EKG Date: 10/03/18


Time: 17:42


Rhythm: Other (sinus tachycardia)


Rate (Beats/Min): 102


Axis: LAD-Left Axis Deviation (borderline.)


P-Wave: Present (atrial enlargementborderlin)


QRS: Other (borderline R wave progression anterior leads.)


ST-T: Normal


QT: Normal





Course





- Vital Signs


Last Recorded V/S: 





 Last Vital Signs











Temp  97.7 F   10/03/18 19:45


 


Pulse  98   10/03/18 19:45


 


Resp  19   10/03/18 19:45


 


BP  110/68   10/03/18 19:45


 


Pulse Ox  95   10/03/18 19:45














- Orders/Labs/Meds


Orders: 





 Active Orders 24 hr











 Category Date Time Status


 


 Blood Glucose Check, Bedside [RC] ONETIME Care  10/03/18 18:22 Active


 


 Peripheral IV Care [RC] .AS DIRECTED Care  10/03/18 17:47 Active


 


 Head wo Cont [CT] Urgent Exams  10/03/18 17:30 Taken


 


 Sodium Chloride 0.9% [Saline Flush] Med  10/03/18 17:47 Active





 10 ml FLUSH ASDIRECTED PRN   


 


 Peripheral IV Insertion Adult [OM.PC] Stat Oth  10/03/18 17:46 Ordered








 Medication Orders





Sodium Chloride (Saline Flush)  10 ml FLUSH ASDIRECTED PRN


   PRN Reason: Keep Vein Open


   Last Admin: 10/03/18 18:40  Dose: 10 ml


   Admin: 10/03/18 17:55  Dose: 10 ml








Labs: 





 Laboratory Tests











  10/03/18 10/03/18 10/03/18 Range/Units





  17:35 18:02 18:02 


 


WBC   10.6 H   (5.0-10.0)  10^3/uL


 


RBC   4.33   (4.2-5.4)  10^6/uL


 


Hgb   12.4   (12.0-16.0)  g/dL


 


Hct   36.6 L   (37.0-47.0)  %


 


MCV   84.5   ()  fL


 


MCH   28.6   (27.0-34.0)  pg


 


MCHC   33.9   (33.0-35.0)  g/dL


 


Plt Count   256   (150-450)  10^3/uL


 


Neut % (Auto)   70.2   (42.2-75.2)  %


 


Lymph % (Auto)   18.5 L   (20.5-50.1)  %


 


Mono % (Auto)   6.0   (2-8)  %


 


Eos % (Auto)   4.8 H   (1.0-3.0)  %


 


Baso % (Auto)   0.5   (0.0-1.0)  %


 


PT    8.5 L  (9.0-12.0)  SEC


 


INR    0.9  (0.9-1.2)  


 


APTT    22.2  (22.0-34.0)  SEC


 


Sodium     (135-145)  mmol/L


 


Potassium     (3.6-5.0)  mmol/L


 


Chloride     (101-111)  mmol/L


 


Carbon Dioxide     (21.0-31.0)  mmol/L


 


Anion Gap     


 


BUN     (7-18)  mg/dL


 


Creatinine     (0.6-1.3)  mg/dL


 


Est Cr Clr Drug Dosing     mL/min


 


Estimated GFR (MDRD)     


 


BUN/Creatinine Ratio     


 


Glucose     ()  mg/dL


 


POC Glucose  > 500 H*    ()  mg/dl


 


Calcium     (8.4-10.2)  mg/dl


 


Total Bilirubin     (0.2-1.0)  mg/dL


 


AST     (10-42)  IU/L


 


ALT     (10-60)  IU/L


 


Alkaline Phosphatase     ()  IU/L


 


Troponin I     (0.00-0.02)  ng/ml


 


Total Protein     (6.7-8.2)  g/dl


 


Albumin     (3.2-5.5)  g/dl


 


Globulin     


 


Albumin/Globulin Ratio     


 


Lipase     (22-51)  U/L


 


Urine Color     (YELLOW)  


 


Urine Appearance     (CLEAR)  


 


Urine pH     (5.0-9.0)  


 


Ur Specific Gravity     (1.005-1.030)  


 


Urine Protein     (NEGATIVE)  


 


Urine Glucose (UA)     (NEGATIVE)  


 


Urine Ketones     (NEGATIVE)  


 


Urine Occult Blood     (NEGATIVE)  


 


Urine Nitrite     (NEGATIVE)  


 


Urine Bilirubin     (NEGATIVE)  


 


Urine Urobilinogen     (0.2-1.0)  mg/dL


 


Ur Leukocyte Esterase     (NEGATIVE)  


 


Urine RBC     /HPF


 


Urine WBC     (0-5/HPF)  /HPF


 


Ur Epithelial Cells     /HPF


 


Amorphous Sediment     (0/HPF)  /HPF


 


Urine Bacteria     (0-FEW/HPF)  /HPF


 


Urine Opiates Screen     (NEGATIVE)  


 


Ur Oxycodone Screen     (NEGATIVE)  


 


Urine Methadone Screen     (NEGATIVE)  


 


Ur Barbiturates Screen     (NEGATIVE)  


 


U Tricyclic Antidepress     (NEGATIVE)  


 


Ur Phencyclidine Scrn     (NEGATIVE)  


 


Ur Amphetamine Screen     (NEGATIVE)  


 


U Methamphetamines Scrn     (NEGATIVE)  


 


Urine MDMA Screen     (NEGATIVE)  


 


U Benzodiazepines Scrn     (NEGATIVE)  


 


Urine Cocaine Screen     (NEGATIVE)  


 


U Marijuana (THC) Screen     (NEGATIVE)  


 


Ethyl Alcohol     mg/dL


 


Ketones     














  10/03/18 10/03/18 10/03/18 Range/Units





  18:02 18:25 19:04 


 


WBC     (5.0-10.0)  10^3/uL


 


RBC     (4.2-5.4)  10^6/uL


 


Hgb     (12.0-16.0)  g/dL


 


Hct     (37.0-47.0)  %


 


MCV     ()  fL


 


MCH     (27.0-34.0)  pg


 


MCHC     (33.0-35.0)  g/dL


 


Plt Count     (150-450)  10^3/uL


 


Neut % (Auto)     (42.2-75.2)  %


 


Lymph % (Auto)     (20.5-50.1)  %


 


Mono % (Auto)     (2-8)  %


 


Eos % (Auto)     (1.0-3.0)  %


 


Baso % (Auto)     (0.0-1.0)  %


 


PT     (9.0-12.0)  SEC


 


INR     (0.9-1.2)  


 


APTT     (22.0-34.0)  SEC


 


Sodium  131 L    (135-145)  mmol/L


 


Potassium  4.0    (3.6-5.0)  mmol/L


 


Chloride  98 L    (101-111)  mmol/L


 


Carbon Dioxide  25.0    (21.0-31.0)  mmol/L


 


Anion Gap  12.0    


 


BUN  27 H    (7-18)  mg/dL


 


Creatinine  1.2    (0.6-1.3)  mg/dL


 


Est Cr Clr Drug Dosing  60.35    mL/min


 


Estimated GFR (MDRD)  49    


 


BUN/Creatinine Ratio  22.50    


 


Glucose  589 H*    ()  mg/dL


 


POC Glucose   453 H*   ()  mg/dl


 


Calcium  8.7    (8.4-10.2)  mg/dl


 


Total Bilirubin  0.3    (0.2-1.0)  mg/dL


 


AST  15    (10-42)  IU/L


 


ALT  11    (10-60)  IU/L


 


Alkaline Phosphatase  110    ()  IU/L


 


Troponin I  0.03 H*    (0.00-0.02)  ng/ml


 


Total Protein  6.7    (6.7-8.2)  g/dl


 


Albumin  2.9 L    (3.2-5.5)  g/dl


 


Globulin  3.8    


 


Albumin/Globulin Ratio  0.76    


 


Lipase  52 H    (22-51)  U/L


 


Urine Color    Light yellow  (YELLOW)  


 


Urine Appearance    Cloudy  (CLEAR)  


 


Urine pH    5.5  (5.0-9.0)  


 


Ur Specific Gravity    1.020  (1.005-1.030)  


 


Urine Protein    >=300 H  (NEGATIVE)  


 


Urine Glucose (UA)    >=1000 H  (NEGATIVE)  


 


Urine Ketones    Negative  (NEGATIVE)  


 


Urine Occult Blood    Trace-intact H  (NEGATIVE)  


 


Urine Nitrite    Negative  (NEGATIVE)  


 


Urine Bilirubin    Negative  (NEGATIVE)  


 


Urine Urobilinogen    0.2  (0.2-1.0)  mg/dL


 


Ur Leukocyte Esterase    Negative  (NEGATIVE)  


 


Urine RBC    0-5  /HPF


 


Urine WBC    0-5  (0-5/HPF)  /HPF


 


Ur Epithelial Cells    Few  /HPF


 


Amorphous Sediment    Many H  (0/HPF)  /HPF


 


Urine Bacteria    Many H  (0-FEW/HPF)  /HPF


 


Urine Opiates Screen     (NEGATIVE)  


 


Ur Oxycodone Screen     (NEGATIVE)  


 


Urine Methadone Screen     (NEGATIVE)  


 


Ur Barbiturates Screen     (NEGATIVE)  


 


U Tricyclic Antidepress     (NEGATIVE)  


 


Ur Phencyclidine Scrn     (NEGATIVE)  


 


Ur Amphetamine Screen     (NEGATIVE)  


 


U Methamphetamines Scrn     (NEGATIVE)  


 


Urine MDMA Screen     (NEGATIVE)  


 


U Benzodiazepines Scrn     (NEGATIVE)  


 


Urine Cocaine Screen     (NEGATIVE)  


 


U Marijuana (THC) Screen     (NEGATIVE)  


 


Ethyl Alcohol  < 5    mg/dL


 


Ketones  Negative    














  10/03/18 10/03/18 Range/Units





  19:04 19:39 


 


WBC    (5.0-10.0)  10^3/uL


 


RBC    (4.2-5.4)  10^6/uL


 


Hgb    (12.0-16.0)  g/dL


 


Hct    (37.0-47.0)  %


 


MCV    ()  fL


 


MCH    (27.0-34.0)  pg


 


MCHC    (33.0-35.0)  g/dL


 


Plt Count    (150-450)  10^3/uL


 


Neut % (Auto)    (42.2-75.2)  %


 


Lymph % (Auto)    (20.5-50.1)  %


 


Mono % (Auto)    (2-8)  %


 


Eos % (Auto)    (1.0-3.0)  %


 


Baso % (Auto)    (0.0-1.0)  %


 


PT    (9.0-12.0)  SEC


 


INR    (0.9-1.2)  


 


APTT    (22.0-34.0)  SEC


 


Sodium    (135-145)  mmol/L


 


Potassium    (3.6-5.0)  mmol/L


 


Chloride    (101-111)  mmol/L


 


Carbon Dioxide    (21.0-31.0)  mmol/L


 


Anion Gap    


 


BUN    (7-18)  mg/dL


 


Creatinine    (0.6-1.3)  mg/dL


 


Est Cr Clr Drug Dosing    mL/min


 


Estimated GFR (MDRD)    


 


BUN/Creatinine Ratio    


 


Glucose    ()  mg/dL


 


POC Glucose   264 H  ()  mg/dl


 


Calcium    (8.4-10.2)  mg/dl


 


Total Bilirubin    (0.2-1.0)  mg/dL


 


AST    (10-42)  IU/L


 


ALT    (10-60)  IU/L


 


Alkaline Phosphatase    ()  IU/L


 


Troponin I    (0.00-0.02)  ng/ml


 


Total Protein    (6.7-8.2)  g/dl


 


Albumin    (3.2-5.5)  g/dl


 


Globulin    


 


Albumin/Globulin Ratio    


 


Lipase    (22-51)  U/L


 


Urine Color    (YELLOW)  


 


Urine Appearance    (CLEAR)  


 


Urine pH    (5.0-9.0)  


 


Ur Specific Gravity    (1.005-1.030)  


 


Urine Protein    (NEGATIVE)  


 


Urine Glucose (UA)    (NEGATIVE)  


 


Urine Ketones    (NEGATIVE)  


 


Urine Occult Blood    (NEGATIVE)  


 


Urine Nitrite    (NEGATIVE)  


 


Urine Bilirubin    (NEGATIVE)  


 


Urine Urobilinogen    (0.2-1.0)  mg/dL


 


Ur Leukocyte Esterase    (NEGATIVE)  


 


Urine RBC    /HPF


 


Urine WBC    (0-5/HPF)  /HPF


 


Ur Epithelial Cells    /HPF


 


Amorphous Sediment    (0/HPF)  /HPF


 


Urine Bacteria    (0-FEW/HPF)  /HPF


 


Urine Opiates Screen  Negative   (NEGATIVE)  


 


Ur Oxycodone Screen  Negative   (NEGATIVE)  


 


Urine Methadone Screen  Negative   (NEGATIVE)  


 


Ur Barbiturates Screen  Negative   (NEGATIVE)  


 


U Tricyclic Antidepress  Negative   (NEGATIVE)  


 


Ur Phencyclidine Scrn  Negative   (NEGATIVE)  


 


Ur Amphetamine Screen  Negative   (NEGATIVE)  


 


U Methamphetamines Scrn  Negative   (NEGATIVE)  


 


Urine MDMA Screen  Negative   (NEGATIVE)  


 


U Benzodiazepines Scrn  Negative   (NEGATIVE)  


 


Urine Cocaine Screen  Negative   (NEGATIVE)  


 


U Marijuana (THC) Screen  Negative   (NEGATIVE)  


 


Ethyl Alcohol    mg/dL


 


Ketones    











Meds: 





Medications











Generic Name Dose Route Start Last Admin





  Trade Name Freq  PRN Reason Stop Dose Admin


 


Sodium Chloride  10 ml  10/03/18 17:47  10/03/18 18:40





  Saline Flush  FLUSH   10 ml





  ASDIRECTED PRN   Administration





  Keep Vein Open   





     





     





     














Discontinued Medications














Generic Name Dose Route Start Last Admin





  Trade Name Freq  PRN Reason Stop Dose Admin


 


Sodium Chloride  1,000 mls @ 999 mls/hr  10/03/18 18:24  10/03/18 18:42





  Normal Saline  IV  10/03/18 19:24  999 mls/hr





  .BOLUS ONE   Administration





     





     





     





     


 


Insulin Human Regular  10 unit  10/03/18 18:24  10/03/18 18:39





  Humulin R  IV  10/03/18 18:25  10 units





  ONETIME ONE   Administration





     





     





     





     














- Radiology Interpretation


Free Text/Narrative:: 


Head CT: Hypodense lesion in the right thalamus probably an old lacunar 

infarct. ASPECTS SCORE 10. No sign of acute hemorrhagic or ischemic stroke. Low 

grade chronic sinus disease in the right maxillary sinus. See rad report. 





Departure





- Departure


Disposition: DC/Tfer to [facility]


Clinical Impression: 


 Hyperglycemia, Stroke-like symptoms





Diarrhea


Qualifiers:


 Diarrhea type: unspecified type Qualified Code(s): R19.7 - Diarrhea, 

unspecified








- Discharge Information


Forms:  ED Department Discharge, Interfacility Transfer EMTALA





<Jessica Main - Last Filed: 10/03/18 21:03>





Course





- Re-Assessments/Exams


Free Text/Narrative Re-Assessment/Exam: 





10/03/18 21:01


Discussed Patient case with Dr. Rodriguez who states since we are unable to do an 

Echo and properly work her up for stroke, he would like to send her to . 

Discussed patient case with Dr. Piedra at [facility] who agreed to accept the 

patient for transfer. 








Departure





- Departure


Time of Disposition: 21:02


Condition: Fair





- Discharge Information


*PRESCRIPTION DRUG MONITORING PROGRAM REVIEWED*: No


*COPY OF PRESCRIPTION DRUG MONITORING REPORT IN PATIENT LEONID: No





I have read and agree with the documentation that has been completed regarding 

this visit. By signing this record, I attest that the documentation was 

completed in my physical presence and is an accurate record of the encounter.